# Patient Record
Sex: MALE | URBAN - METROPOLITAN AREA
[De-identification: names, ages, dates, MRNs, and addresses within clinical notes are randomized per-mention and may not be internally consistent; named-entity substitution may affect disease eponyms.]

---

## 2017-03-14 ENCOUNTER — OFFICE VISIT (OUTPATIENT)
Dept: FAMILY MEDICINE | Facility: CLINIC | Age: 22
End: 2017-03-14
Payer: COMMERCIAL

## 2017-03-14 VITALS
DIASTOLIC BLOOD PRESSURE: 80 MMHG | BODY MASS INDEX: 18.07 KG/M2 | HEIGHT: 69 IN | OXYGEN SATURATION: 99 % | HEART RATE: 104 BPM | WEIGHT: 122 LBS | SYSTOLIC BLOOD PRESSURE: 120 MMHG | TEMPERATURE: 97.5 F

## 2017-03-14 DIAGNOSIS — B35.4 TINEA CORPORIS: ICD-10-CM

## 2017-03-14 DIAGNOSIS — Z79.4 ENCOUNTER FOR LONG-TERM (CURRENT) USE OF INSULIN (H): ICD-10-CM

## 2017-03-14 DIAGNOSIS — Z23 NEED FOR PROPHYLACTIC VACCINATION AGAINST STREPTOCOCCUS PNEUMONIAE (PNEUMOCOCCUS): ICD-10-CM

## 2017-03-14 DIAGNOSIS — Z11.3 SCREENING EXAMINATION FOR VENEREAL DISEASE: ICD-10-CM

## 2017-03-14 DIAGNOSIS — Z23 NEED FOR HPV VACCINE: ICD-10-CM

## 2017-03-14 PROCEDURE — 90651 9VHPV VACCINE 2/3 DOSE IM: CPT | Performed by: PHYSICIAN ASSISTANT

## 2017-03-14 PROCEDURE — 90472 IMMUNIZATION ADMIN EACH ADD: CPT | Performed by: PHYSICIAN ASSISTANT

## 2017-03-14 PROCEDURE — 90732 PPSV23 VACC 2 YRS+ SUBQ/IM: CPT | Performed by: PHYSICIAN ASSISTANT

## 2017-03-14 PROCEDURE — 87591 N.GONORRHOEAE DNA AMP PROB: CPT | Performed by: PHYSICIAN ASSISTANT

## 2017-03-14 PROCEDURE — 87491 CHLMYD TRACH DNA AMP PROBE: CPT | Performed by: PHYSICIAN ASSISTANT

## 2017-03-14 PROCEDURE — 90471 IMMUNIZATION ADMIN: CPT | Performed by: PHYSICIAN ASSISTANT

## 2017-03-14 PROCEDURE — 99214 OFFICE O/P EST MOD 30 MIN: CPT | Mod: 25 | Performed by: PHYSICIAN ASSISTANT

## 2017-03-14 RX ORDER — PRENATAL VIT 91/IRON/FOLIC/DHA 28-975-200
COMBINATION PACKAGE (EA) ORAL 2 TIMES DAILY
Qty: 12 G | Refills: 1 | Status: SHIPPED | OUTPATIENT
Start: 2017-03-14 | End: 2018-06-08

## 2017-03-14 NOTE — MR AVS SNAPSHOT
After Visit Summary   3/14/2017    Leanne Landis    MRN: [id number]           Patient Information     Date Of Birth          1995        Visit Information        Provider Department      3/14/2017 1:20 PM Philly Venegas PA-C [facility]        Today's Diagnoses     Uncontrolled type 1 diabetes mellitus with complication (H)    -  1    Screening examination for venereal disease        Need for HPV vaccine        Need for prophylactic vaccination against Streptococcus pneumoniae (pneumococcus)        Encounter for long-term (current) use of insulin (H)        Tinea corporis          Care Instructions    Call to see endo and call to see the diabetic educator  Get eye exam  If you have not seen the endocrinologist in 3 weeks return to clinic   Check sugars 3 times a day -record them or bring meter        Follow-ups after your visit        Additional Services     DIABETES EDUCATOR REFERRAL       DIABETES SELF MANAGEMENT TRAINING (DSMT)      Your provider has referred you to Diabetes Education: [facility]: Diabetes Education - [facility] [phone]   [email or web address]    Type of training and number of hours: Previous Diagnosis: Follow-up DSMT - 2 hours.    Medicare covers: 10 hours of initial DSMT in 12 month period from the time of first visit, plus 2 hours of follow-up DSMT annually, and additional hours as requested for insulin training.    Diabetes Type: Type 1             Diabetes Co-Morbidities: none               A1C Goal:  <7.0       A1C is: Lab Results       Component                Value               Date                       A1C                      9.2                 11/05/2014              If an urgent visit is needed or A1C is above 12, Care Team to call the Diabetes Education Team at [phone] or send an In Basket message to the Diabetes Education Pool (P DIAB ED-PATIENT CARE).    Diabetes  Education Topics: Comprehensive Knowledge Assessment and Instruction    Special Educational Needs Requiring Individual DSMT: None       MEDICAL NUTRITION THERAPY (MNT) for Diabetes    Medical Nutrition Therapy with a Registered Dietitian can be provided in coordination with Diabetes Self-Management Training to assist in achieving optimal diabetes management.    MNT Type and Hours: Previous diagnosis: Annual follow-up MNT - 2 hours                       Medicare will cover: 3 hours initial MNT in 12 month period after first visit, plus 2 hours of follow-up MNT annually    Please be aware that coverage of these services is subject to the terms and limitations of your health insurance plan.  Call member services at your health plan to determine Diabetes Self-Management Training benefits and ask which blood glucose monitor brands are covered by your plan.      Please bring the following with you to your appointment:    (1)  List of current medications   (2)  List of Blood Glucose Monitor brands that are covered by your insurance plan  (3)  Blood Glucose Monitor and log book  (4)   Food records for the 3 days prior to your visit    The Certified Diabetes Educator may make diabetes medication adjustments per the CDE Protocol and Collaborative Practice Agreement.            ENDOCRINOLOGY ADULT REFERRAL       Your provider has referred you to: [facility]: Endocrinology and Diabetes Clinic [facility] [phone]   [email or web address]      Please be aware that coverage of these services is subject to the terms and limitations of your health insurance plan.  Call member services at your health plan with any benefit or coverage questions.      Please bring the following to your appointment:    >>   Any x-rays, CTs or MRIs which have been performed.  Contact the facility where they were done to arrange for  prior to your scheduled appointment.    >>   List of current  medications   >>   This referral request   >>   Any documents/labs given to you for this referral            OPTOMETRY REFERRAL       Your provider has referred you to:  [facility]: [facility] [place] [phone]   [email or web address]    Please be aware that coverage of these services is subject to the terms and limitations of your health insurance plan.  Call member services at your health plan with any benefit or coverage questions.      Please bring the following to your appointment:  >>   Any x-rays, CTs or MRIs which have been performed.  Contact the facility where they were done to arrange for  prior to your scheduled appointment.  Any new CT, MRI or other procedures ordered by your specialist must be performed at a [place] facility or coordinated by your clinic's referral office.    >>   List of current medications   >>   This referral request   >>   Any documents/labs given to you for this referral                  Who to contact     If you have questions or need follow up information about today's clinic visit or your schedule please contact [facility] directly at [phone].  Normal or non-critical lab and imaging results will be communicated to you by MyChart, letter or phone within 4 business days after the clinic has received the results. If you do not hear from us within 7 days, please contact the clinic through [employer] or phone. If you have a critical or abnormal lab result, we will notify you by phone as soon as possible.  Submit refill requests through [employer] or call your pharmacy and they will forward the refill request to us. Please allow 3 business days for your refill to be completed.          Additional Information About Your Visit        [employer] Information     [employer] lets you send messages to your doctor, view your test results, renew your prescriptions, schedule appointments and more. To sign up, go to  [email or web address] . Click on \"Log in\" on the left side of the screen, which will take you to the Welcome page. Then click on \"Sign up Now\" on the right side of the page.     You will be asked to enter the access code listed below, as well as some personal information. Please follow the directions to create your username and password.     Your access code is: [email or web address]  Expires: 2017  2:24 PM     Your access code will  in 90 days. If you need help or a new code, please call your [place] clinic or [phone].        Care EveryWhere ID     This is your Care EveryWhere ID. This could be used by other organizations to access your [place] medical records  [id number]        Your Vitals Were     Pulse Temperature Height Pulse Oximetry BMI (Body Mass Index)       104 97.5  F (36.4  C) (Oral) 5' 9\" (1.753 m) 99% 18.02 kg/m2        Blood Pressure from Last 3 Encounters:   17 120/80   16 128/82   16 (!) 135/94    Weight from Last 3 Encounters:   17 122 lb (55.3 kg)   16 145 lb (65.8 kg)   16 133 lb (60.3 kg)              We Performed the Following          ADMIN VACCINE, ADDL [43005]          ADMIN VACCINE, FIRST [68254]     C HUMAN PAPILLOMA VIRUS VACCINE (GARDASIL 9) 3 DOSE IM     CHLAMYDIA TRACHOMATIS PCR     DIABETES EDUCATOR REFERRAL     ENDOCRINOLOGY ADULT REFERRAL     NEISSERIA GONORRHOEA PCR     OPTOMETRY REFERRAL     PNEUMOVOCCAL VACCINE 23 VALENT (PNEUMOVAX 23)          Today's Medication Changes          These changes are accurate as of: 3/14/17  2:24 PM.  If you have any questions, ask your nurse or doctor.               Start taking these medicines.        Dose/Directions    terbinafine 1 % cream   Commonly known as:  lamISIL AT   Used for:  Tinea corporis   Started by:  Philly Venegas PA-C        Apply topically 2 times daily For fungal infection not resolved with other antifungals (e.g. Clotrimazole)   Quantity:  12 g   Refills:  1       "   These medicines have changed or have updated prescriptions.        Dose/Directions    insulin aspart 100 UNIT/ML injection   Commonly known as:  NovoLOG PENFILL   This may have changed:  additional instructions   Used for:  Encounter for long-term (current) use of insulin (H)   Changed by:  Philly Venegas PA-C        20 units before breakfast, 20 units before lunch, 25-30 units before dinner using sliding scale   Quantity:  30 mL   Refills:  0       insulin glargine 100 UNIT/ML injection   Commonly known as:  LANTUS SOLOSTAR   This may have changed:  additional instructions   Used for:  Encounter for long-term (current) use of insulin (H)   Changed by:  Philly Venegas PA-C        Dose:  25 Units   Inject 25 Units Subcutaneous At Bedtime Increase by 1 unit every 2 nights until blood sugar in am are less than 120   Quantity:  8 mL   Refills:  1            Where to get your medicines      These medications were sent to [facility]/pharmacy #[id number] - [place], [place] - [address], [place] [address]     Phone:  [phone]     blood glucose monitoring test strip    insulin aspart 100 UNIT/ML injection    insulin glargine 100 UNIT/ML injection    terbinafine 1 % cream                Primary Care Provider    Md Other Clinic                Thank you!     Thank you for choosing [facility]  for your care. Our goal is always to provide you with excellent care. Hearing back from our patients is one way we can continue to improve our services. Please take a few minutes to complete the written survey that you may receive in the mail after your visit with us. Thank you!             Your Updated Medication List - Protect others around you: Learn how to safely use, store and throw away your medicines at www.disposemymeds.org.          This list is accurate as of: 3/14/17  2:24 PM.  Always use your most recent med list.                   Brand Name Dispense  Instructions for use    blood glucose monitoring test strip    ACCU-CHEK SMARTVIEW    200 each    For 4-6 times per day testing       glucagon 1 MG kit    GLUCAGON EMERGENCY    1 mg    Inject 1 mg into the muscle once for 1 dose       insulin aspart 100 UNIT/ML injection    NovoLOG PENFILL    30 mL    20 units before breakfast, 20 units before lunch, 25-30 units before dinner using sliding scale       insulin glargine 100 UNIT/ML injection    LANTUS SOLOSTAR    8 mL    Inject 25 Units Subcutaneous At Bedtime Increase by 1 unit every 2 nights until blood sugar in am are less than 120       terbinafine 1 % cream    lamISIL AT    12 g    Apply topically 2 times daily For fungal infection not resolved with other antifungals (e.g. Clotrimazole)

## 2017-03-14 NOTE — PATIENT INSTRUCTIONS
Call to see endo and call to see the diabetic educator  Get eye exam  If you have not seen the endocrinologist in 3 weeks return to clinic   Check sugars 3 times a day -record them or bring meter

## 2017-03-14 NOTE — LETTER
[facility]  [address] [place]  [address]  [phone]        March 15, 2017    Leanne Landis  [address]  [place] [address]        Dear Leanne,    Your chlamydia and gonorrhea testing were negative.     Results for orders placed or performed in visit on 03/14/17   NEISSERIA GONORRHOEA PCR   Result Value Ref Range    Specimen Descrip Urine     N Gonorrhea PCR  NEG     Negative   Negative for N. gonorrhoeae rRNA by transcription mediated amplification.   A negative result by transcription mediated amplification does not preclude the   presence of N. gonorrhoeae infection because results are dependent on proper   and adequate collection, absence of inhibitors, and sufficient rRNA to be   detected.     CHLAMYDIA TRACHOMATIS PCR   Result Value Ref Range    Specimen Description Urine     Chlamydia Trachomatis PCR  NEG     Negative   Negative for C. trachomatis rRNA by transcription mediated amplification.   A negative result by transcription mediated amplification does not preclude the   presence of C. trachomatis infection because results are dependent on proper   and adequate collection, absence of inhibitors, and sufficient rRNA to be   detected.         If you have any questions please call the clinic at [phone].    Sincerely,    Philly ROSAS

## 2017-03-14 NOTE — PROGRESS NOTES
"  SUBJECTIVE:                                                    Leanne Landis is a 21 year old male who presents to clinic today for the following health issues:        Diabetes Follow-up      Patient is checking blood sugars: rarely.  Results are around 200+    Diabetic concerns: blood sugar frequently over 200     Symptoms of hypoglycemia (low blood sugar): none     Paresthesias (numbness or burning in feet) or sores: No     Date of last diabetic eye exam: Due now     Hyperlipidemia Follow-Up      Rate your low fat/cholesterol diet?: good    Taking statin?  No    Other lipid medications/supplements?:  none     Hypertension Follow-up      Outpatient blood pressures are not being checked.    Low Salt Diet: not monitoring salt       Hypothyroidism Follow-up      Since last visit, patient describes the following symptoms: weight loss of 20 lbs       Amount of exercise or physical activity: None    Problems taking medications regularly: Yes,  problems remembering to take    Not eating    Medication side effects: none  Diet: trying to stay low carb    Didn't want to see Endo due to how far out appointment was.  Using left over medications.  He had an appointment in Dec but no showed.  He was in the ER last year due to DKA.  When pressed on why he didn't go to Endo he said he was too busy.  He states he called for refill of medications multiple times.  No notes in the chart.      Taking \"about 20 units\" of lantus at night.  Can't give me an exact number.    novolog as directed-using sliding scale mostly for dinner.    Has been low on insulin so sometimes doesn't eat.      Has some bumps on mons pubis and started after shaving.  Still there.  No discharge.  They itch.          Problem list and histories reviewed & adjusted, as indicated.  Additional history: as documented    Patient Active Problem List   Diagnosis     Diabetes mellitus type 1 (H)     Encounter for long-term (current) use of insulin (H)     Type 1 diabetes " "mellitus, uncontrolled (H)     Type 1 diabetes, HbA1c goal < 7% (H)     History reviewed. No pertinent past surgical history.    Social History   Substance Use Topics     Smoking status: Current Every Day Smoker     Packs/day: 0.50     Years: 2.00     Types: Cigarettes     Smokeless tobacco: Never Used     Alcohol use Yes      Comment: a few glasses on the weekends      Family History   Problem Relation Age of Onset     Depression Mother      Hypertension Father      DIABETES Maternal Grandmother      Hypertension Maternal Grandmother      Thyroid Disease Maternal Grandmother      Hypertension Maternal Grandfather            Reviewed and updated as needed this visit by clinical staff       Reviewed and updated as needed this visit by Provider         ROS:  As above    OBJECTIVE:                                                    /80 (BP Location: Left arm, Patient Position: Chair, Cuff Size: Adult Regular)  Pulse 104  Temp 97.5  F (36.4  C) (Oral)  Ht 5' 9\" (1.753 m)  Wt 122 lb (55.3 kg)  SpO2 99%  BMI 18.02 kg/m2  Body mass index is 18.02 kg/(m^2).  GENERAL: healthy, alert and no distress  SKIN: firm nodules with some scaling on pubis     Diagnostic Test Results:  none      ASSESSMENT/PLAN:                                                    1. Uncontrolled type 1 diabetes mellitus with complication (H)  Spoke to pt about importance of him taking responsibility for his cristin and making it a priority.  Still needs to see endo.  Would like him to see DE first or as soon as able.  Follow up in clinic with me in 3 weeks to make sure we keep everything on tract.  Adjust lantus until sugars are below 120 in am.    - DIABETES EDUCATOR REFERRAL  - OPTOMETRY REFERRAL  - ENDOCRINOLOGY ADULT REFERRAL  - PNEUMOVOCCAL VACCINE 23 VALENT (PNEUMOVAX 23)  - insulin glargine (LANTUS SOLOSTAR) 100 UNIT/ML injection; Inject 25 Units Subcutaneous At Bedtime Increase by 1 unit every 2 nights until blood sugar in am are less than " 120  Dispense: 8 mL; Refill: 1  - insulin aspart (NOVOLOG PENFILL) 100 UNIT/ML injection; 20 units before breakfast, 20 units before lunch, 25-30 units before dinner using sliding scale  Dispense: 30 mL; Refill: 0  - blood glucose monitoring (ACCU-CHEK SMARTVIEW) test strip; For 4-6 times per day testing  Dispense: 200 each; Refill: 1    2. Encounter for long-term (current) use of insulin (H)      3. Screening examination for venereal disease  Follow up as needed  - NEISSERIA GONORRHOEA PCR  - CHLAMYDIA TRACHOMATIS PCR    4. Need for HPV vaccine    - C HUMAN PAPILLOMA VIRUS VACCINE (GARDASIL 9) 3 DOSE IM  -      ADMIN VACCINE, FIRST [56733]    5. Need for prophylactic vaccination against Streptococcus pneumoniae (pneumococcus)    -      ADMIN VACCINE, ADDL [61306]    6. Tinea corporis  If not improving follow up as needed    - terbinafine (LAMISIL AT) 1 % cream; Apply topically 2 times daily For fungal infection not resolved with other antifungals (e.g. Clotrimazole)  Dispense: 12 g; Refill: 1    FUTURE APPOINTMENTS:       - Follow-up visit in 3 weeks     Philly Venegas PA-C  [facility]

## 2017-03-14 NOTE — NURSING NOTE
"Chief Complaint   Patient presents with     Diabetes     Hypertension     Lipids     Thyroid Disease       Initial /80 (BP Location: Left arm, Patient Position: Chair, Cuff Size: Adult Regular)  Pulse 104  Temp 97.5  F (36.4  C) (Oral)  Ht 5' 9\" (1.753 m)  Wt 122 lb (55.3 kg)  SpO2 99%  BMI 18.02 kg/m2 Estimated body mass index is 18.02 kg/(m^2) as calculated from the following:    Height as of this encounter: 5' 9\" (1.753 m).    Weight as of this encounter: 122 lb (55.3 kg).  Medication Reconciliation: complete   Xuan Raymundo CMA       "

## 2017-03-15 LAB
C TRACH DNA SPEC QL NAA+PROBE: NORMAL
N GONORRHOEA DNA SPEC QL NAA+PROBE: NORMAL
SPECIMEN SOURCE: NORMAL
SPECIMEN SOURCE: NORMAL

## 2017-03-16 ENCOUNTER — TELEPHONE (OUTPATIENT)
Dept: PEDIATRICS | Facility: CLINIC | Age: 22
End: 2017-03-16

## 2017-03-16 ENCOUNTER — TELEPHONE (OUTPATIENT)
Dept: FAMILY MEDICINE | Facility: CLINIC | Age: 22
End: 2017-03-16

## 2017-03-16 NOTE — TELEPHONE ENCOUNTER
Patient needs insulin sent before he leaves to go out of town.  Pharmacy selected, he stated novolog not covered but humalog is.  I cued up similar as the novolog and routed to Dr. Schumacher to address.  Leticia Kohli RN  [facility]

## 2017-03-16 NOTE — TELEPHONE ENCOUNTER
Reason for Call:   prescription    Detailed comments: Sure script now has a online portal to start prior Auth. He states on the 2nd page of information they mailed to you it will have a TRX code and you will enter that to get in and complete this process.  ([employer]/paportal)    Phone Number Patient can be reached at:   [employer] [facility] [phone]       Best Time: anytime    Can we leave a detailed message on this number? NO    Call taken on 3/16/2017 at 2:35 PM by Gertrudis Roper

## 2017-03-16 NOTE — TELEPHONE ENCOUNTER
Another encounter in regards to this has already been sent. Patient just needs to change from Novolog to Humalog. Unsure as to why there is PA information as the other encounter states Humalog should be covered.  Will close out this encounter.    Nina Obregon RN  [facility]

## 2017-03-16 NOTE — TELEPHONE ENCOUNTER
Reason for Call:  Other prescription    Detailed comments: patient calling back, Insulin aspart (Novolog) is not covered under his insurance, needs to be switched to Hemalog. Please resend it to [facility]/pharmacy #[id number] - [place], [place] - [address]. Patient states this needs to be completed ASAP, he states he is boarding a flight at 2 PM.     Phone Number Patient can be reached at: Home number on file [phone] (home)    Best Time: any    Can we leave a detailed message on this number? YES    Call taken on 3/16/2017 at 12:46 PM by Emiliana Lehman

## 2017-03-16 NOTE — TELEPHONE ENCOUNTER
Uncontrolled type 1 diabetes mellitus with complication (H)  Spoke to pt about importance of him taking responsibility for his cristin and making it a priority. Still needs to see endo. Would like him to see DE first or as soon as able. Follow up in clinic with me in 3 weeks to make sure we keep everything on tract. Adjust lantus until sugars are below 120 in am.   - DIABETES EDUCATOR REFERRAL  - OPTOMETRY REFERRAL  - ENDOCRINOLOGY ADULT REFERRAL  - PNEUMOVOCCAL VACCINE 23 VALENT (PNEUMOVAX 23)  - insulin glargine (LANTUS SOLOSTAR) 100 UNIT/ML injection; Inject 25 Units Subcutaneous At Bedtime Increase by 1 unit every 2 nights until blood sugar in am are less than 120 Dispense: 8 mL; Refill: 1  - insulin aspart (NOVOLOG PENFILL) 100 UNIT/ML injection; 20 units before breakfast, 20 units before lunch, 25-30 units before dinner using sliding scale Dispense: 30 mL; Refill: 0  - blood glucose monitoring (ACCU-CHEK SMARTVIEW) test strip; For 4-6 times per day testing Dispense: 200 each; Refill: 1    Insulin aspart (Novolog)         Last Written Prescription Date: 3/14/17  Last Fill Quantity: 30 ml, # refills: 0  Last Office Visit with [facility] prescribing provider:  3/14/17   Next 5 appointments (look out 90 days)     Apr 04, 2017 10:00 AM CDT   Office Visit with Philly Venegas PA-C   [facility]    [address] [facility] [address] [phone]                   BP Readings from Last 3 Encounters:   03/14/17 120/80   11/07/16 128/82   06/24/16 (!) 135/94     Lab Results   Component Value Date    MICROL  01/21/2014     <5  Urine Microalbumin lowest reportable value has been changed from 2 mg/L to 5   mg/L due to a methodology change on April 16,2012.     No results found for: MICROALBUMIN  Creatinine   Date Value Ref Range Status   06/22/2016 0.73 mg/dL Final   ]  GFR Estimate   Date Value Ref Range Status    01/21/2014 >90 >60 mL/min/1.7m2 Final     GFR Estimate If Black   Date Value Ref Range Status   01/21/2014 >90 >60 mL/min/1.7m2 Final     Lab Results   Component Value Date    CHOL 150 01/21/2014     Lab Results   Component Value Date    HDL 59 01/21/2014     Lab Results   Component Value Date    LDL 65 01/21/2014     Lab Results   Component Value Date    TRIG 129 01/21/2014     Lab Results   Component Value Date    CHOLHDLRATIO 2.5 01/21/2014     Lab Results   Component Value Date    AST 24 01/21/2014     Lab Results   Component Value Date    ALT 24 01/21/2014     Lab Results   Component Value Date    A1C 9.2 11/05/2014    A1C 10.9 06/25/2014    A1C 9.3 04/07/2014    A1C 11.1 01/21/2014     Potassium   Date Value Ref Range Status   06/22/2016 3.0 (L) mmol/L Final   Routing refill request to provider for review/approval because:  No results for microalbumin, potassium low.  Nina Obregon RN  [facility]

## 2017-03-16 NOTE — TELEPHONE ENCOUNTER
Reason for Call:  Medication or medication refill:    Do you use a [facility]?  Name of the pharmacy and phone number for the current request:  [facility], pended    Name of the medication requested: insulin aspart (NOVOLOG PENFILL) 100 UNIT/ML injection    Other request: patient was seen on 3-14-17, but only a partial refill of this medication was sent.  Patient is needing it sent into pharmacy today please.    Can we leave a detailed message on this number? YES    Phone number patient can be reached at: Home number on file [phone] (home)    Best Time: any    Call taken on 3/16/2017 at 10:54 AM by Mei Velarde

## 2017-03-16 NOTE — TELEPHONE ENCOUNTER
See message below. Patient now saying insulin aspart is no longer covered by insurance and that it needs to be changed to Humalog.    Routed to provider to advise.  Nina Obregon RN  [facility]

## 2017-03-17 NOTE — TELEPHONE ENCOUNTER
Called patient at [phone] (home) to notify him that his insulin has been changed to Humalog. Unable to reach, left a VM to call back to RN triage line.    Nina Obregon RN  [facility]

## 2017-03-20 NOTE — TELEPHONE ENCOUNTER
Called patient at [phone] (home) to notify him of message below from provider. Unable to reach, left a VM to call back to RN triage line.    Nina Obregon RN  [facility]

## 2017-04-19 ENCOUNTER — TELEPHONE (OUTPATIENT)
Dept: FAMILY MEDICINE | Facility: CLINIC | Age: 22
End: 2017-04-19

## 2017-04-19 NOTE — LETTER
April 25, 2017    Leanne Landis  [address]  [facility] [address]    Dear Leanne    We care about your health and have reviewed your health plan. We have reviewed your medical conditions, medication list, and lab results and are making recommendations based on this review, to better manage your health.    You are in particular need of attention regarding:  - Your Diabetes      Here is a list of Health Maintenance topics that are due now or due soon:  Health Maintenance Due   Topic Date Due     FOOT EXAM Q1 YEAR( NO INBASKET)  12/29/1995     EYE EXAM Q1 YEAR( NO INBASKET)  12/29/1995     DIABETIC EDUCATION Q1 YEAR (NO INBASKET)  12/29/1995     TETANUS IMMUNIZATION (SYSTEM ASSIGNED)  12/29/2012     LIPID MONITORING Q1 YEAR( NO INBASKET)  01/21/2015     MICROALBUMIN Q1 YEAR( NO INBASKET)  01/21/2015     A1C Q6 MO( NO INBASKET)  05/05/2015     TSH W/ FREE T4 REFLEX Q2 YEAR (NO INBASKET)  01/21/2016     We will be calling you in the next couple of weeks to help you schedule any appointments that are needed.  Please call us at [phone] (or use [employer]) to address the above recommendations.     Thank you for trusting [facility] and we appreciate the opportunity to serve you.  We look forward to supporting your healthcare needs in the future.    Healthy Regards,    Your [facility] Care Team/nr

## 2017-04-19 NOTE — TELEPHONE ENCOUNTER
Please call pt.  He missed his last Diabetes follow up visit and needs to reschedule.  Needs to bring meter and come fasting.    Philly Venegas PA-C

## 2017-05-17 ENCOUNTER — TELEPHONE (OUTPATIENT)
Dept: FAMILY MEDICINE | Facility: CLINIC | Age: 22
End: 2017-05-17

## 2017-05-17 ENCOUNTER — OFFICE VISIT (OUTPATIENT)
Dept: FAMILY MEDICINE | Facility: CLINIC | Age: 22
End: 2017-05-17
Payer: COMMERCIAL

## 2017-05-17 VITALS
OXYGEN SATURATION: 100 % | TEMPERATURE: 97.7 F | BODY MASS INDEX: 18.46 KG/M2 | SYSTOLIC BLOOD PRESSURE: 120 MMHG | HEART RATE: 109 BPM | DIASTOLIC BLOOD PRESSURE: 79 MMHG | WEIGHT: 125 LBS

## 2017-05-17 DIAGNOSIS — Z72.0 TOBACCO ABUSE: ICD-10-CM

## 2017-05-17 DIAGNOSIS — R53.83 FATIGUE, UNSPECIFIED TYPE: ICD-10-CM

## 2017-05-17 DIAGNOSIS — E10.9 TYPE 1 DIABETES MELLITUS WITHOUT COMPLICATION (H): Primary | ICD-10-CM

## 2017-05-17 LAB
ALBUMIN SERPL-MCNC: 3.7 G/DL (ref 3.4–5)
ALP SERPL-CCNC: 79 U/L (ref 40–150)
ALT SERPL W P-5'-P-CCNC: 23 U/L (ref 0–70)
ANION GAP SERPL CALCULATED.3IONS-SCNC: 8 MMOL/L (ref 3–14)
AST SERPL W P-5'-P-CCNC: 20 U/L (ref 0–45)
BASOPHILS # BLD AUTO: 0 10E9/L (ref 0–0.2)
BASOPHILS NFR BLD AUTO: 0.6 %
BILIRUB SERPL-MCNC: 0.3 MG/DL (ref 0.2–1.3)
BUN SERPL-MCNC: 10 MG/DL (ref 7–30)
CALCIUM SERPL-MCNC: 9.5 MG/DL (ref 8.5–10.1)
CHLORIDE SERPL-SCNC: 108 MMOL/L (ref 94–109)
CHOLEST SERPL-MCNC: 193 MG/DL
CO2 SERPL-SCNC: 28 MMOL/L (ref 20–32)
CREAT SERPL-MCNC: 0.61 MG/DL (ref 0.66–1.25)
DIFFERENTIAL METHOD BLD: NORMAL
EOSINOPHIL # BLD AUTO: 0.2 10E9/L (ref 0–0.7)
EOSINOPHIL NFR BLD AUTO: 3.6 %
ERYTHROCYTE [DISTWIDTH] IN BLOOD BY AUTOMATED COUNT: 13.1 % (ref 10–15)
GFR SERPL CREATININE-BSD FRML MDRD: ABNORMAL ML/MIN/1.7M2
GLUCOSE SERPL-MCNC: 73 MG/DL (ref 70–99)
HCT VFR BLD AUTO: 44.5 % (ref 40–53)
HDLC SERPL-MCNC: 64 MG/DL
HGB BLD-MCNC: 15.4 G/DL (ref 13.3–17.7)
LDLC SERPL CALC-MCNC: 83 MG/DL
LYMPHOCYTES # BLD AUTO: 2.4 10E9/L (ref 0.8–5.3)
LYMPHOCYTES NFR BLD AUTO: 50.7 %
MCH RBC QN AUTO: 30.4 PG (ref 26.5–33)
MCHC RBC AUTO-ENTMCNC: 34.6 G/DL (ref 31.5–36.5)
MCV RBC AUTO: 88 FL (ref 78–100)
MONOCYTES # BLD AUTO: 0.4 10E9/L (ref 0–1.3)
MONOCYTES NFR BLD AUTO: 9.1 %
NEUTROPHILS # BLD AUTO: 1.7 10E9/L (ref 1.6–8.3)
NEUTROPHILS NFR BLD AUTO: 36 %
NONHDLC SERPL-MCNC: 129 MG/DL
PLATELET # BLD AUTO: 367 10E9/L (ref 150–450)
POTASSIUM SERPL-SCNC: 3.9 MMOL/L (ref 3.4–5.3)
PROT SERPL-MCNC: 7.7 G/DL (ref 6.8–8.8)
RBC # BLD AUTO: 5.07 10E12/L (ref 4.4–5.9)
SODIUM SERPL-SCNC: 144 MMOL/L (ref 133–144)
TRIGL SERPL-MCNC: 228 MG/DL
TSH SERPL DL<=0.005 MIU/L-ACNC: 1.13 MU/L (ref 0.4–4)
WBC # BLD AUTO: 4.7 10E9/L (ref 4–11)

## 2017-05-17 PROCEDURE — 36415 COLL VENOUS BLD VENIPUNCTURE: CPT | Performed by: FAMILY MEDICINE

## 2017-05-17 PROCEDURE — 83036 HEMOGLOBIN GLYCOSYLATED A1C: CPT | Performed by: FAMILY MEDICINE

## 2017-05-17 PROCEDURE — 99207 C FOOT EXAM  NO CHARGE: CPT | Mod: 25 | Performed by: FAMILY MEDICINE

## 2017-05-17 PROCEDURE — 80050 GENERAL HEALTH PANEL: CPT | Performed by: FAMILY MEDICINE

## 2017-05-17 PROCEDURE — 99213 OFFICE O/P EST LOW 20 MIN: CPT | Performed by: FAMILY MEDICINE

## 2017-05-17 PROCEDURE — 80061 LIPID PANEL: CPT | Performed by: FAMILY MEDICINE

## 2017-05-17 ASSESSMENT — PAIN SCALES - GENERAL: PAINLEVEL: NO PAIN (0)

## 2017-05-17 NOTE — PATIENT INSTRUCTIONS
Increase insulin doses needed as needed    Work on complete smoking cessation    Call to schedule endocrinology consult    We will send you lab results

## 2017-05-17 NOTE — MR AVS SNAPSHOT
After Visit Summary   5/17/2017    Leanne Landis    MRN: [id number]           Patient Information     Date Of Birth          1995        Visit Information        Provider Department      5/17/2017 11:00 AM Avery Goodman MD [facility]        Today's Diagnoses     Type 1 diabetes mellitus without complication (H)    -  1    Fatigue, unspecified type          Care Instructions    Increase insulin doses needed as needed    Work on complete smoking cessation    Call to schedule endocrinology consult    We will send you lab results          Follow-ups after your visit        Additional Services     DIABETES EDUCATOR REFERRAL       DIABETES SELF MANAGEMENT TRAINING (DSMT)      Your provider has referred you to Diabetes Education: [facility]: Diabetes Education - All [facility] [phone]   [email or web address]    Type of training and number of hours: Previous Diagnosis: Follow-up DSMT - 2 hours.    Medicare covers: 10 hours of initial DSMT in 12 month period from the time of first visit, plus 2 hours of follow-up DSMT annually, and additional hours as requested for insulin training.    Diabetes Type: Type 1             Diabetes Co-Morbidities: none               A1C Goal:  <8.0       A1C is: Lab Results       Component                Value               Date                       A1C                      9.2                 11/05/2014              If an urgent visit is needed or A1C is above 12, Care Team to call the Diabetes Education Team at [phone] or send an In Basket message to the Diabetes Education Pool (P DIAB ED-PATIENT CARE).    Diabetes Education Topics: Comprehensive Knowledge Assessment and Instruction    Special Educational Needs Requiring Individual DSMT: None       MEDICAL NUTRITION THERAPY (MNT) for Diabetes    Medical Nutrition Therapy with a Registered Dietitian can be provided in coordination with  Diabetes Self-Management Training to assist in achieving optimal diabetes management.    MNT Type and Hours: Previous diagnosis: Annual follow-up MNT - 2 hours                       Medicare will cover: 3 hours initial MNT in 12 month period after first visit, plus 2 hours of follow-up MNT annually    Please be aware that coverage of these services is subject to the terms and limitations of your health insurance plan.  Call member services at your health plan to determine Diabetes Self-Management Training benefits and ask which blood glucose monitor brands are covered by your plan.      Please bring the following with you to your appointment:    (1)  List of current medications   (2)  List of Blood Glucose Monitor brands that are covered by your insurance plan  (3)  Blood Glucose Monitor and log book  (4)   Food records for the 3 days prior to your visit    The Certified Diabetes Educator may make diabetes medication adjustments per the CDE Protocol and Collaborative Practice Agreement.            ENDOCRINOLOGY ADULT REFERRAL       Your provider has referred you to: [facility]: Endocrinology and Diabetes Clinic [facility] [phone]   [email or web address]  [facility]: [facility] [phone]   [email or web address]      Please be aware that coverage of these services is subject to the terms and limitations of your health insurance plan.  Call member services at your health plan with any benefit or coverage questions.      Please bring the following to your appointment:    >>   Any x-rays, CTs or MRIs which have been performed.  Contact the facility where they were done to arrange for  prior to your scheduled appointment.    >>   List of current medications   >>   This referral request   >>   Any documents/labs given to you for this referral            OPTOMETRY REFERRAL       Your  "provider has referred you to:  [facility]: [facility] [place] Becki George [phone]    [email or web address]      Please be aware that coverage of these services is subject to the terms and limitations of your health insurance plan.  Call member services at your health plan with any benefit or coverage questions.      Please bring the following to your appointment:  >>   Any x-rays, CTs or MRIs which have been performed.  Contact the facility where they were done to arrange for  prior to your scheduled appointment.  Any new CT, MRI or other procedures ordered by your specialist must be performed at a [facility] facility or coordinated by your clinic's referral office.    >>   List of current medications   >>   This referral request   >>   Any documents/labs given to you for this referral                  Who to contact     If you have questions or need follow up information about today's clinic visit or your schedule please contact [facility] directly at [phone].  Normal or non-critical lab and imaging results will be communicated to you by [employer], letter or phone within 4 business days after the clinic has received the results. If you do not hear from us within 7 days, please contact the clinic through [employer] or phone. If you have a critical or abnormal lab result, we will notify you by phone as soon as possible.  Submit refill requests through [employer] or call your pharmacy and they will forward the refill request to us. Please allow 3 business days for your refill to be completed.          Additional Information About Your Visit        [employer] Information     [employer] lets you send messages to your doctor, view your test results, renew your prescriptions, schedule appointments and more. To sign up, go to [email or web address] . Click on \"Log in\" on the left side of the screen, which will take you to the Welcome page. Then click on \"Sign up Now\" on the right " side of the page.     You will be asked to enter the access code listed below, as well as some personal information. Please follow the directions to create your username and password.     Your access code is: [email or web address]  Expires: 2017  2:24 PM     Your access code will  in 90 days. If you need help or a new code, please call your [place] clinic or [phone].        Care EveryWhere ID     This is your Care EveryWhere ID. This could be used by other organizations to access your [facility] medical records  [id number]        Your Vitals Were     Pulse Temperature Pulse Oximetry BMI (Body Mass Index)          109 97.7  F (36.5  C) (Oral) 100% 18.46 kg/m2         Blood Pressure from Last 3 Encounters:   17 120/79   17 120/80   16 128/82    Weight from Last 3 Encounters:   17 125 lb (56.7 kg)   17 122 lb (55.3 kg)   16 145 lb (65.8 kg)              We Performed the Following     CBC with platelets differential     Comprehensive metabolic panel     DIABETES EDUCATOR REFERRAL     ENDOCRINOLOGY ADULT REFERRAL     FOOT EXAM     Lipid panel reflex to direct LDL     OPTOMETRY REFERRAL     TSH with free T4 reflex          Today's Medication Changes          These changes are accurate as of: 17 12:06 PM.  If you have any questions, ask your nurse or doctor.               These medicines have changed or have updated prescriptions.        Dose/Directions    insulin glargine 100 UNIT/ML injection   Commonly known as:  LANTUS SOLOSTAR   This may have changed:    - how much to take  - how to take this  - when to take this  - additional instructions   Used for:  Type 1 diabetes mellitus without complication (H)   Changed by:  Avery Goodman MD        Increase by 1 unit every 2 nights until blood sugar in am are less than 120 Currently at 25 units but needs to increase   Quantity:  20 mL   Refills:  1       Insulin Lispro 200 UNIT/ML soln   Commonly known as:  HUMALOG KWIKPEN    This may have changed:  additional instructions   Used for:  Type 1 diabetes mellitus without complication (H)   Changed by:  Avery Goodman MD        20 units with meals 3 x daily but increase as needed based on sliding scale   Quantity:  15 mL   Refills:  1            Where to get your medicines      These medications were sent to [facility]/pharmacy #[id number] - [place], [place] - [address]  [address], [place] [address]     Phone:  [phone]     insulin glargine 100 UNIT/ML injection    Insulin Lispro 200 UNIT/ML soln                Primary Care Provider    [facility] Other Clinic                Thank you!     Thank you for choosing [facility]  for your care. Our goal is always to provide you with excellent care. Hearing back from our patients is one way we can continue to improve our services. Please take a few minutes to complete the written survey that you may receive in the mail after your visit with us. Thank you!             Your Updated Medication List - Protect others around you: Learn how to safely use, store and throw away your medicines at www.disposemymeds.org.          This list is accurate as of: 5/17/17 12:06 PM.  Always use your most recent med list.                   Brand Name Dispense Instructions for use    blood glucose monitoring test strip    ACCU-CHEK SMARTVIEW    200 each    For 4-6 times per day testing       glucagon 1 MG kit    GLUCAGON EMERGENCY    1 mg    Inject 1 mg into the muscle once for 1 dose       insulin glargine 100 UNIT/ML injection    LANTUS SOLOSTAR    20 mL    Increase by 1 unit every 2 nights until blood sugar in am are less than 120 Currently at 25 units but needs to increase       Insulin Lispro 200 UNIT/ML soln    HUMALOG KWIKPEN    15 mL    20 units with meals 3 x daily but increase as needed based on sliding scale       terbinafine 1 % cream    lamISIL AT    12 g    Apply topically 2 times daily For fungal infection not resolved  with other antifungals (e.g. Clotrimazole)

## 2017-05-17 NOTE — LETTER
[facility]  [address] [place]  [address]  [phone]        May 19, 2017    Leanne Landis  [address]  [facility] [address]        Dear Leanne,    As expected, the overall diabetes test ( hemoglobin a1c ) is very high.       See endocrinology as we discussed.  Also see the diabetes educator.     The triglycerides are high.  Other labs are okay.     Results for orders placed or performed in visit on 05/17/17   Lipid panel reflex to direct LDL   Result Value Ref Range    Cholesterol 193 <200 mg/dL    Triglycerides 228 (H) <150 mg/dL    HDL Cholesterol 64 >39 mg/dL    LDL Cholesterol Calculated 83 <100 mg/dL    Non HDL Cholesterol 129 <130 mg/dL   TSH with free T4 reflex   Result Value Ref Range    TSH 1.13 0.40 - 4.00 mU/L   Comprehensive metabolic panel   Result Value Ref Range    Sodium 144 133 - 144 mmol/L    Potassium 3.9 3.4 - 5.3 mmol/L    Chloride 108 94 - 109 mmol/L    Carbon Dioxide 28 20 - 32 mmol/L    Anion Gap 8 3 - 14 mmol/L    Glucose 73 70 - 99 mg/dL    Urea Nitrogen 10 7 - 30 mg/dL    Creatinine 0.61 (L) 0.66 - 1.25 mg/dL    GFR Estimate >90  Non  GFR Calc   >60 mL/min/1.7m2    GFR Estimate If Black >90   GFR Calc   >60 mL/min/1.7m2    Calcium 9.5 8.5 - 10.1 mg/dL    Bilirubin Total 0.3 0.2 - 1.3 mg/dL    Albumin 3.7 3.4 - 5.0 g/dL    Protein Total 7.7 6.8 - 8.8 g/dL    Alkaline Phosphatase 79 40 - 150 U/L    ALT 23 0 - 70 U/L    AST 20 0 - 45 U/L   CBC with platelets differential   Result Value Ref Range    WBC 4.7 4.0 - 11.0 10e9/L    RBC Count 5.07 4.4 - 5.9 10e12/L    Hemoglobin 15.4 13.3 - 17.7 g/dL    Hematocrit 44.5 40.0 - 53.0 %    MCV 88 78 - 100 fl    MCH 30.4 26.5 - 33.0 pg    MCHC 34.6 31.5 - 36.5 g/dL    RDW 13.1 10.0 - 15.0 %    Platelet Count 367 150 - 450 10e9/L    Diff Method Automated Method     % Neutrophils 36.0 %    % Lymphocytes 50.7 %    % Monocytes 9.1 %    % Eosinophils 3.6 %    % Basophils  0.6 %    Absolute Neutrophil 1.7 1.6 - 8.3 10e9/L    Absolute Lymphocytes 2.4 0.8 - 5.3 10e9/L    Absolute Monocytes 0.4 0.0 - 1.3 10e9/L    Absolute Eosinophils 0.2 0.0 - 0.7 10e9/L    Absolute Basophils 0.0 0.0 - 0.2 10e9/L   Hemoglobin A1c   Result Value Ref Range    Hemoglobin A1C 12.0 (H) 4.3 - 6.0 %       If you have any questions please call the clinic at [phone].    Sincerely,    Avery Goodman MD  SK[facility]

## 2017-05-17 NOTE — NURSING NOTE
"Chief Complaint   Patient presents with     Diabetes     Health Maintenance       Initial /79 (BP Location: Left arm, Patient Position: Chair, Cuff Size: Adult Small)  Pulse 109  Temp 97.7  F (36.5  C) (Oral)  Wt 125 lb (56.7 kg)  SpO2 100%  BMI 18.46 kg/m2 Estimated body mass index is 18.46 kg/(m^2) as calculated from the following:    Height as of 3/14/17: 5' 9\" (1.753 m).    Weight as of this encounter: 125 lb (56.7 kg).  Medication Reconciliation: complete   Liane Bravo CMA      "

## 2017-05-17 NOTE — PROGRESS NOTES
SUBJECTIVE:                                                    Leanne Landis is a 21 year old male who presents to clinic today for the following health issues:       Diabetes Follow-up      Patient is checking blood sugars: 2-4 times daily averaging about low 200's    Diabetic concerns: blood sugar frequently over 200     Symptoms of hypoglycemia (low blood sugar): none     Paresthesias (numbness or burning in feet) or sores: No     Date of last diabetic eye exam: ?       Amount of exercise or physical activity: 6-7 days/week for an average of 15-30 minutes    Problems taking medications regularly: No    Medication side effects: none    Diet: diabetic      none    Problem list and histories reviewed & adjusted, as indicated.  Additional history: as documented         Reviewed and updated as needed this visit by clinical staff  Tobacco  Allergies  Meds  Problems  Med Hx  Surg Hx  Fam Hx  Soc Hx        Reviewed and updated as needed this visit by Provider           Since about age 11    Studying global studies in college  One more semester        Physical Exam   Constitutional: He is oriented to person, place, and time and well-developed, well-nourished, and in no distress. No distress.   HENT:   Head: Normocephalic and atraumatic.   Eyes: Conjunctivae are normal.   Neck: Carotid bruit is not present.   Cardiovascular: Normal rate, regular rhythm, normal heart sounds and intact distal pulses.  Exam reveals no gallop and no friction rub.    No murmur heard.  Pulmonary/Chest: Effort normal and breath sounds normal. No respiratory distress. He has no wheezes. He has no rales.   Musculoskeletal: He exhibits no edema.   Neurological: He is alert and oriented to person, place, and time.   Skin: He is not diaphoretic.   Psychiatric: Mood and affect normal.   foot exam normal bilat      Type 1       Has seen endocrinology in past but not recently    humalog 20 tid with meals   lantus is 25 daily    Some extra on  sliding scale    Total insulin in 24 hours about 100 units    Mid 100s to low 200s    From [facility]        Eating jose    Smokes about 5 cigarettes per day    Wants to quit on his own    Exercise   Jogging, gym daily     No  Eye problems from diabetes    ASSESSMENT / PLAN:  (E10.9) Type 1 diabetes mellitus without complication (H)  (primary encounter diagnosis)  Comment: check labs; hemoglobin a1c likely to be high  Plan: FOOT EXAM, OPTOMETRY REFERRAL, DIABETES         EDUCATOR REFERRAL, Lipid panel reflex to direct        LDL, ENDOCRINOLOGY ADULT REFERRAL, insulin         glargine (LANTUS SOLOSTAR) 100 UNIT/ML         injection, Insulin Lispro (HUMALOG KWIKPEN) 200        UNIT/ML soln, CANCELED: Albumin Random Urine         Quantitative        Prudent to see endocrinology; patient to schedule  Advise he see diab ed also     (R53.83) Fatigue, unspecified type  Comment: check   Plan: TSH with free T4 reflex, Comprehensive         metabolic panel, CBC with platelets         differential             (Z72.0) Tobacco abuse  Comment: discussed   Plan: advised complete smoking cessation      I reviewed the patient's medications, allergies, medical history, family history, and social history.    Avery Goodman MD

## 2017-05-18 LAB — HBA1C MFR BLD: 12 % (ref 4.3–6)

## 2017-05-18 NOTE — TELEPHONE ENCOUNTER
Please call lab and see if they can add an hemoglobin a1c to blood just drawn in clinic.  I put in future order.  Lab can release order.  Thanks    Avery Goodman MD

## 2017-05-19 NOTE — PROGRESS NOTES
As expected, the overall diabetes test ( hemoglobin a1c ) is very high.      See endocrinology as we discussed.  Also see the diabetes educator.    The triglycerides are high.  Other labs are okay.    Avery Goodman MD

## 2017-11-27 ENCOUNTER — TRANSFERRED RECORDS (OUTPATIENT)
Dept: HEALTH INFORMATION MANAGEMENT | Facility: CLINIC | Age: 22
End: 2017-11-27

## 2017-12-27 RX ORDER — INSULIN GLARGINE 100 [IU]/ML
INJECTION, SOLUTION SUBCUTANEOUS
Qty: 15 ML | Refills: 0 | Status: SHIPPED | OUTPATIENT
Start: 2017-12-27 | End: 2018-06-08

## 2017-12-27 NOTE — TELEPHONE ENCOUNTER
Routing refill request to provider for review/approval because:  Labs out of range:  Cr  Labs not current:  Microalbumin (2014)        Lesly Segovia RN  [facility]

## 2017-12-27 NOTE — TELEPHONE ENCOUNTER
Requested Prescriptions   Pending Prescriptions Disp Refills     BASAGLAR 100 UNIT/ML injection [Pharmacy Med Name: BASAGLAR 100 UNIT/ML KWIKPEN]  1     Sig: INJECT 25 UNITS SUBQ AT BEDTIME INCREASE BY 1 UNIT EVERY 2 NIGHTS UNTIL BLOOD SUGAR IN AM <120    Long Acting Insulin Protocol Failed    12/24/2017 12:01 PM       Failed - Blood pressure less than 140/90 in past 6 months    BP Readings from Last 3 Encounters:   05/17/17 120/79   03/14/17 120/80   11/07/16 128/82                Failed - Microalbumin on file in past 12 months    Recent Labs   Lab Test  01/21/14   1425   MICROL  <5 Urine Microalbumin lowest reportable value has been changed from 2 mg/L to 5  mg/L due to a methodology change on April 16,2012.   UMALCR  Unable to calculate            Failed - HgbA1C in past 3 or 6 months    Recent Labs   Lab Test  05/17/17   1210   A1C  12.0*            Failed - Recent visit with authorizing provider's specialty in past 6 months    Patient had office visit in the last 6 months or has a visit in the next 30 days with authorizing provider.  See chart review.            Passed - LDL on file in past 12 months    Recent Labs   Lab Test  05/17/17   1210   LDL  83            Passed - Serum creatinine on file in past 12 months    Recent Labs   Lab Test  05/17/17   1210   CR  0.61*            Passed - Patient is age 18 or older

## 2017-12-31 ENCOUNTER — NURSE TRIAGE (OUTPATIENT)
Dept: NURSING | Facility: CLINIC | Age: 22
End: 2017-12-31

## 2017-12-31 NOTE — TELEPHONE ENCOUNTER
"Caller is out of his Prozac and wants and on call provider through [facility] to fill his prescription. Today is the first day without it. I explained there are no on call providers for [facility] and I can not see in his [facility] chart the order for Prozac, since it was ordered through [facility] provider. I gave him Urgent care options recommended by [facility], to be seen today if he would like for refill.     Katherine Hurd RN  [facility] RN  Lung Nodule and ED Lab Result F/u RN      Reason for Disposition    [1] Request for URGENT new prescription or refill of \"essential\" medication (i.e., likelihood of harm to patient if not taken) AND [2] triager unable to fill per unit policy    Additional Information    Negative: Drug overdose and nurse unable to answer question    Negative: Caller requesting information not related to medicine    Negative: Caller requesting a prescription for Strep throat and has a positive culture result    Negative: Rash while taking a medication or within 3 days of stopping it    Negative: Immunization reaction suspected    Negative: [1] Asthma and [2] having symptoms of asthma (cough, wheezing, etc)    Negative: MORE THAN A DOUBLE DOSE of a prescription or over-the-counter (OTC) drug    Negative: [1] DOUBLE DOSE (an extra dose or lesser amount) of over-the-counter (OTC) drug AND [2] any symptoms (e.g., dizziness, nausea, pain, sleepiness)    Negative: [1] DOUBLE DOSE (an extra dose or lesser amount) of prescription drug AND [2] any symptoms (e.g., dizziness, nausea, pain, sleepiness)    Negative: Took another person's prescription drug    Negative: [1] DOUBLE DOSE (an extra dose or lesser amount) of prescription drug AND [2] NO symptoms (Exception: a double dose of antibiotics)    Negative: Diabetes drug error or overdose (e.g., insulin or extra dose)    Protocols used: MEDICATION QUESTION CALL-ADULT-    "

## 2018-01-01 NOTE — TELEPHONE ENCOUNTER
Patient calling back again via [facility]'s call line to try to get a Prozac refill. Explained to patient what FNA explained the last time he called, that we are not able to get this medication filled for him tonight via on-call MD.  Kerry Adams RN  [facility]

## 2018-02-12 ENCOUNTER — TRANSFERRED RECORDS (OUTPATIENT)
Dept: HEALTH INFORMATION MANAGEMENT | Facility: CLINIC | Age: 23
End: 2018-02-12

## 2018-04-03 RX ORDER — INSULIN GLARGINE 100 [IU]/ML
INJECTION, SOLUTION SUBCUTANEOUS
Refills: 0 | OUTPATIENT
Start: 2018-04-03

## 2018-04-03 NOTE — TELEPHONE ENCOUNTER
"Requested Prescriptions   Pending Prescriptions Disp Refills     BASAGLAR 100 UNIT/ML injection [Pharmacy Med Name: BASAGLAR 100 UNIT/ML KWIKPEN]  0    Last Written Prescription Date:  12/27/17  Last Fill Quantity: 15,  # refills: 0   Last office visit: 5/17/2017 with prescribing provider:     Future Office Visit:     Sig: INJECT 25 UNITS SUBQ AT BEDTIME INCREASE BY 1 UNIT EVERY 2 NIGHTS UNTIL BLOOD SUGAR IN AM <120    Long Acting Insulin Protocol Failed    4/3/2018  1:43 AM       Failed - Blood pressure less than 140/90 in past 6 months    BP Readings from Last 3 Encounters:   05/17/17 120/79   03/14/17 120/80   11/07/16 128/82                Failed - Microalbumin on file in past 12 months    Recent Labs   Lab Test  01/21/14   1425   MICROL  <5 Urine Microalbumin lowest reportable value has been changed from 2 mg/L to 5  mg/L due to a methodology change on April 16,2012.   UMALCR  Unable to calculate            Failed - HgbA1C in past 3 or 6 months    Recent Labs   Lab Test  05/17/17   1210   A1C  12.0*            Failed - Recent (6 mo) or future (30 days) visit within the authorizing provider's specialty    Patient had office visit in the last 6 months or has a visit in the next 30 days with authorizing provider or within the authorizing provider's specialty.  See \"Patient Info\" tab in inbasket, or \"Choose Columns\" in Meds & Orders section of the refill encounter.           Passed - LDL on file in past 12 months    Recent Labs   Lab Test  05/17/17   1210   LDL  83            Passed - Serum creatinine on file in past 12 months    Recent Labs   Lab Test  05/17/17   1210   CR  0.61*            Passed - Patient is age 18 or older          "

## 2018-04-03 NOTE — TELEPHONE ENCOUNTER
Refill was denied.  Left detailed message, informing patient of provider note below.  Told to schedule an appointment with Endo at [facility] or [facility].  See referral.

## 2018-05-18 ENCOUNTER — TELEPHONE (OUTPATIENT)
Dept: FAMILY MEDICINE | Facility: CLINIC | Age: 23
End: 2018-05-18

## 2018-05-18 NOTE — TELEPHONE ENCOUNTER
Reason for Call:  Medication or medication refill:    Do you use a [facility]?  Name of the pharmacy and phone number for the current request:  [facility], pended    Name of the medication requested: blood glucose monitoring (ACCU-CHEK SMARTVIEW) test strip    Other request: patient is scheduled with provider on 5-21-18, but is needing a refill before then.  Please inform when done.    Can we leave a detailed message on this number? YES    Phone number patient can be reached at: Home number on file [phone] (home)    Best Time: any    Call taken on 5/18/2018 at 12:16 PM by Mei Velarde    Requested Prescriptions   Pending Prescriptions Disp Refills     blood glucose monitoring (ACCU-CHEK SMARTVIEW) test strip 200 each 1     Sig: For 4-6 times per day testing    There is no refill protocol information for this order

## 2018-06-08 ENCOUNTER — OFFICE VISIT (OUTPATIENT)
Dept: FAMILY MEDICINE | Facility: CLINIC | Age: 23
End: 2018-06-08
Payer: COMMERCIAL

## 2018-06-08 VITALS
SYSTOLIC BLOOD PRESSURE: 102 MMHG | DIASTOLIC BLOOD PRESSURE: 72 MMHG | BODY MASS INDEX: 17.92 KG/M2 | WEIGHT: 121 LBS | HEART RATE: 102 BPM | OXYGEN SATURATION: 96 % | TEMPERATURE: 98.6 F | HEIGHT: 69 IN

## 2018-06-08 DIAGNOSIS — E44.1 MILD PROTEIN-CALORIE MALNUTRITION (H): ICD-10-CM

## 2018-06-08 DIAGNOSIS — Z00.01 ENCOUNTER FOR PREVENTATIVE ADULT HEALTH CARE EXAM WITH ABNORMAL FINDINGS: Primary | ICD-10-CM

## 2018-06-08 DIAGNOSIS — F32.2 SEVERE MAJOR DEPRESSION (H): ICD-10-CM

## 2018-06-08 DIAGNOSIS — E10.65 TYPE 1 DIABETES MELLITUS WITH HYPERGLYCEMIA (H): ICD-10-CM

## 2018-06-08 LAB
ANION GAP SERPL CALCULATED.3IONS-SCNC: 10 MMOL/L (ref 3–14)
BUN SERPL-MCNC: 10 MG/DL (ref 7–30)
CALCIUM SERPL-MCNC: 9.1 MG/DL (ref 8.5–10.1)
CHLORIDE SERPL-SCNC: 102 MMOL/L (ref 94–109)
CHOLEST SERPL-MCNC: 145 MG/DL
CO2 SERPL-SCNC: 27 MMOL/L (ref 20–32)
CREAT SERPL-MCNC: 0.78 MG/DL (ref 0.66–1.25)
CREAT UR-MCNC: 200 MG/DL
GFR SERPL CREATININE-BSD FRML MDRD: >90 ML/MIN/1.7M2
GLUCOSE SERPL-MCNC: 178 MG/DL (ref 70–99)
HBA1C MFR BLD: 9.8 % (ref 0–5.6)
HDLC SERPL-MCNC: 53 MG/DL
LDLC SERPL CALC-MCNC: 76 MG/DL
MICROALBUMIN UR-MCNC: 41 MG/L
MICROALBUMIN/CREAT UR: 20.3 MG/G CR (ref 0–17)
NONHDLC SERPL-MCNC: 92 MG/DL
POTASSIUM SERPL-SCNC: 3.9 MMOL/L (ref 3.4–5.3)
SODIUM SERPL-SCNC: 139 MMOL/L (ref 133–144)
TRIGL SERPL-MCNC: 82 MG/DL

## 2018-06-08 PROCEDURE — 82043 UR ALBUMIN QUANTITATIVE: CPT | Performed by: NURSE PRACTITIONER

## 2018-06-08 PROCEDURE — 36415 COLL VENOUS BLD VENIPUNCTURE: CPT | Performed by: NURSE PRACTITIONER

## 2018-06-08 PROCEDURE — 83036 HEMOGLOBIN GLYCOSYLATED A1C: CPT | Performed by: NURSE PRACTITIONER

## 2018-06-08 PROCEDURE — 80048 BASIC METABOLIC PNL TOTAL CA: CPT | Performed by: NURSE PRACTITIONER

## 2018-06-08 PROCEDURE — 99207 C FOOT EXAM  NO CHARGE: CPT | Mod: 25 | Performed by: NURSE PRACTITIONER

## 2018-06-08 PROCEDURE — 99214 OFFICE O/P EST MOD 30 MIN: CPT | Mod: 25 | Performed by: NURSE PRACTITIONER

## 2018-06-08 PROCEDURE — 99395 PREV VISIT EST AGE 18-39: CPT | Performed by: NURSE PRACTITIONER

## 2018-06-08 PROCEDURE — 80061 LIPID PANEL: CPT | Performed by: NURSE PRACTITIONER

## 2018-06-08 RX ORDER — INSULIN GLARGINE 100 [IU]/ML
INJECTION, SOLUTION SUBCUTANEOUS
Qty: 15 ML | Refills: 0 | Status: SHIPPED | OUTPATIENT
Start: 2018-06-08 | End: 2018-07-12

## 2018-06-08 ASSESSMENT — PATIENT HEALTH QUESTIONNAIRE - PHQ9
5. POOR APPETITE OR OVEREATING: NEARLY EVERY DAY
SUM OF ALL RESPONSES TO PHQ QUESTIONS 1-9: 26
SUM OF ALL RESPONSES TO PHQ QUESTIONS 1-9: 26
10. IF YOU CHECKED OFF ANY PROBLEMS, HOW DIFFICULT HAVE THESE PROBLEMS MADE IT FOR YOU TO DO YOUR WORK, TAKE CARE OF THINGS AT HOME, OR GET ALONG WITH OTHER PEOPLE: EXTREMELY DIFFICULT

## 2018-06-08 ASSESSMENT — ANXIETY QUESTIONNAIRES
3. WORRYING TOO MUCH ABOUT DIFFERENT THINGS: NEARLY EVERY DAY
2. NOT BEING ABLE TO STOP OR CONTROL WORRYING: NEARLY EVERY DAY
GAD7 TOTAL SCORE: 21
5. BEING SO RESTLESS THAT IT IS HARD TO SIT STILL: NEARLY EVERY DAY
IF YOU CHECKED OFF ANY PROBLEMS ON THIS QUESTIONNAIRE, HOW DIFFICULT HAVE THESE PROBLEMS MADE IT FOR YOU TO DO YOUR WORK, TAKE CARE OF THINGS AT HOME, OR GET ALONG WITH OTHER PEOPLE: EXTREMELY DIFFICULT
1. FEELING NERVOUS, ANXIOUS, OR ON EDGE: NEARLY EVERY DAY
7. FEELING AFRAID AS IF SOMETHING AWFUL MIGHT HAPPEN: NEARLY EVERY DAY
6. BECOMING EASILY ANNOYED OR IRRITABLE: NEARLY EVERY DAY

## 2018-06-08 ASSESSMENT — PAIN SCALES - GENERAL: PAINLEVEL: NO PAIN (0)

## 2018-06-08 NOTE — LETTER
[facility]   [address] [place]  [address]  [phone]                                   June 12, 2018    Leanne Landis  [address] [facility] [address]        Dear Leanne,    The results of your recent labs are enclosed.  Your A1c is 9.8% which is improved from previously but still elevated above goal of less than 7%. I see that you have no yet scheduled with our diabetes educator or endocrinologist. Please remember to do this. I will not be refilling your insulin without appropriate follow up.     Please call the clinic if you have any concerns.    Results for orders placed or performed in visit on 06/08/18   HEMOGLOBIN A1C   Result Value Ref Range    Hemoglobin A1C 9.8 (H) 0 - 5.6 %   Lipid panel reflex to direct LDL Non-fasting   Result Value Ref Range    Cholesterol 145 <200 mg/dL    Triglycerides 82 <150 mg/dL    HDL Cholesterol 53 >39 mg/dL    LDL Cholesterol Calculated 76 <100 mg/dL    Non HDL Cholesterol 92 <130 mg/dL   Albumin Random Urine Quantitative with Creat Ratio   Result Value Ref Range    Creatinine Urine 200 mg/dL    Albumin Urine mg/L 41 mg/L    Albumin Urine mg/g Cr 20.30 (H) 0 - 17 mg/g Cr   Basic metabolic panel   Result Value Ref Range    Sodium 139 133 - 144 mmol/L    Potassium 3.9 3.4 - 5.3 mmol/L    Chloride 102 94 - 109 mmol/L    Carbon Dioxide 27 20 - 32 mmol/L    Anion Gap 10 3 - 14 mmol/L    Glucose 178 (H) 70 - 99 mg/dL    Urea Nitrogen 10 7 - 30 mg/dL    Creatinine 0.78 0.66 - 1.25 mg/dL    GFR Estimate >90 >60 mL/min/1.7m2    GFR Estimate If Black >90 >60 mL/min/1.7m2    Calcium 9.1 8.5 - 10.1 mg/dL       If you have any questions please call the clinic at APRN [phone]    Sincerely,    Camila Arias APRN CNP  bmd

## 2018-06-08 NOTE — MR AVS SNAPSHOT
After Visit Summary   6/8/2018    Leanne Landis    MRN: [id number]           Patient Information     Date Of Birth          1995        Visit Information        Provider Department      6/8/2018 1:20 PM Camila Arias APRN [facility]        Today's Diagnoses     Encounter for preventative adult health care exam with abnormal findings    -  1    Severe major depression (H)        Type 1 diabetes mellitus with hyperglycemia (H)        Screening examination for venereal disease        Uncontrolled type 1 diabetes mellitus with complication (H)        Type 1 diabetes mellitus without complication (H)          Care Instructions    Schedule an appointment with our diabetes educator  Schedule appointment with endocrine. It may be a few months until you can be seen but I will refill your medications as long you are seeing our diabetes educator    You can call our  to ask about enrolling in [employer] and to ask about therapists who offer sliding scale    I started you on sertraline (zoloft). Follow up in 1 month prior to refill being due      Preventive Health Recommendations  Male Ages 18 - 25     Yearly exam:             See your health care provider every year in order to  o   Review health changes.   o   Discuss preventive care.    o   Review your medicines if your doctor has prescribed any.    You should be tested each year for STDs (sexually transmitted diseases).     Talk to your provider about cholesterol testing.      If you are at risk for diabetes, you should have a diabetes test (fasting glucose).    Shots: Get a flu shot each year. Get a tetanus shot every 10 years.     Nutrition:    Eat at least 5 servings of fruits and vegetables daily.     Eat whole-grain bread, whole-wheat pasta and brown rice instead of white grains and rice.     Talk to your provider about calcium and Vitamin D.     Lifestyle    Exercise for at least 150 minutes a week (30  minutes a day, 5 days a week). This will help you control your weight and prevent disease.     Limit alcohol to one drink per day.     No smoking.     Wear sunscreen to prevent skin cancer.     See your dentist every six months for an exam and cleaning.             Follow-ups after your visit        Additional Services     DIABETES EDUCATOR REFERRAL       DIABETES SELF MANAGEMENT TRAINING (DSMT)      Your provider has referred you to Diabetes Education: [facility]: Diabetes Education - All [facility] [phone]   [email or web address]     If an urgent visit is needed or A1C is above 12, Care Team to call the Diabetes  Education Team at [phone] or send an In Basket message to the Diabetes Education Pool (P DIAB ED-PATIENT CARE).    A  will call you to make your appointment. If it has been more than 3 business days since your referral was placed, please call the above phone number to schedule.    Type of training and number of hours: Previous Diagnosis: Follow-up DSMT - 2 hours.    Diabetes Type: Type 1   Medicare covers: 10 hours of initial DSMT in 12 month period from the time of first visit, plus 2 hours of follow-up DSMT annually, and additional hours as requested for insulin training.         Diabetes Co-Morbidities: other: hyperglycemia, poor compliance in the past and multiple hospitalizations for DKA           A1C Goal:  <7.0       A1C is: Lab Results       Component                Value               Date                       A1C                      12.0                05/17/2017              MEDICAL NUTRITION THERAPY (MNT) for Diabetes    Medical Nutrition Therapy with a Registered Dietitian can be provided in coordination with Diabetes Self-Management Training to assist in achieving optimal diabetes management.    MNT Type and Hours: Do not initiate MNT at this time.                       Medicare will cover: 3 hours initial MNT in 12 month  period after first visit, plus 2 hours of follow-up MNT annually        Diabetes Education Topics: Comprehensive Knowledge Assessment and Instruction and Knowledge: Healthy Eating, Being Active, Monitoring Blood Sugar and Taking Medication    Special Educational Needs Requiring Individual DSMT: Additional Insulin Training      Please be aware that coverage of these services is subject to the terms and limitations of your health insurance plan.  Call member services at your health plan to determine Diabetes Self-Management Training (Codes  and ) and Medical Nutrition Therapy (Codes 57883 and 67301) benefits and ask which blood glucose monitor brands are covered by your plan.  Please bring the following with you to your appointment:    (1)  List of current medications   (2)  List of Blood Glucose Monitor brands that are covered by your insurance plan  (3)  Blood Glucose Monitor and log book  (4)   Food records for the 3 days prior to your visit    The Certified Diabetes Educator may make diabetes medication adjustments per the CDE Protocol and Collaborative Practice Agreement.            ENDOCRINOLOGY ADULT REFERRAL       Your provider has referred you to: [facility]: Endocrinology and Diabetes Clinic [facility] [phone]   [email or web address]      Please be aware that coverage of these services is subject to the terms and limitations of your health insurance plan.  Call member services at your health plan with any benefit or coverage questions.      Please bring the following to your appointment:    >>   Any x-rays, CTs or MRIs which have been performed.  Contact the facility where they were done to arrange for  prior to your scheduled appointment.    >>   List of current medications   >>   This referral request   >>   Any documents/labs given to you for this referral            MENTAL HEALTH REFERRAL  - Adult; Outpatient Treatment;  "Individual/Couples/Family/Group Therapy/Health Psychology; Other: [facility] Providers [phone]; We will contact you to schedule the appointment or please call with any questi...       He is looking for therapist who does sliding scale    All scheduling is subject to the client's specific insurance plan & benefits, provider/location availability, and provider clinical specialities.  Please arrive 15 minutes early for your first appointment and bring your completed paperwork.    Please be aware that coverage of these services is subject to the terms and limitations of your health insurance plan.  Call member services at your health plan with any benefit or coverage questions.                  Who to contact     If you have questions or need follow up information about today's clinic visit or your schedule please contact [facility] directly at [phone].  Normal or non-critical lab and imaging results will be communicated to you by MyChart, letter or phone within 4 business days after the clinic has received the results. If you do not hear from us within 7 days, please contact the clinic through [employer] or phone. If you have a critical or abnormal lab result, we will notify you by phone as soon as possible.  Submit refill requests through [employer] or call your pharmacy and they will forward the refill request to us. Please allow 3 business days for your refill to be completed.          Additional Information About Your Visit        [employer] Information     [employer] lets you send messages to your doctor, view your test results, renew your prescriptions, schedule appointments and more. To sign up, go to [email or web address] . Click on \"Log in\" on the left side of the screen, which will take you to the Welcome page. Then click on \"Sign up Now\" on the right side of the page.     You will be asked to enter the access code listed below, as well as some personal information. Please " "follow the directions to create your username and password.     Your access code is: [email or web address]  Expires: 2018 10:14 AM     Your access code will  in 90 days. If you need help or a new code, please call your [place] clinic or [phone].        Care EveryWhere ID     This is your Care EveryWhere ID. This could be used by other organizations to access your [place] medical records  [id number]        Your Vitals Were     Pulse Temperature Height Pulse Oximetry BMI (Body Mass Index)       102 98.6  F (37  C) (Oral) 5' 9\" (1.753 m) 96% 17.87 kg/m2        Blood Pressure from Last 3 Encounters:   18 102/72   17 120/79   17 120/80    Weight from Last 3 Encounters:   18 121 lb (54.9 kg)   17 125 lb (56.7 kg)   17 122 lb (55.3 kg)              We Performed the Following     Albumin Random Urine Quantitative with Creat Ratio     Basic metabolic panel     DIABETES EDUCATOR REFERRAL     ENDOCRINOLOGY ADULT REFERRAL     FOOT EXAM  NO CHARGE [29358.114]     HEMOGLOBIN A1C     Lipid panel reflex to direct LDL Non-fasting     MENTAL HEALTH REFERRAL  - Adult; Outpatient Treatment; Individual/Couples/Family/Group Therapy/Health Psychology; Other: [facility] Prov[facility] [phone]; We will contact you to schedule the appointment or please call with any questi...          Today's Medication Changes          These changes are accurate as of 18  2:08 PM.  If you have any questions, ask your nurse or doctor.               Start taking these medicines.        Dose/Directions    blood glucose lancets standard   Commonly known as:  no brand specified   Used for:  Type 1 diabetes mellitus with hyperglycemia (H)   Started by:  Camila Arias APRN CNP        Use to test blood sugar 4-6 times daily or as directed. One touch ultra   Quantity:  100 each   Refills:  11       insulin pen needle 31G X 6 MM   Used for:  Type 1 diabetes mellitus with hyperglycemia (H) "   Started by:  Camila Arias APRN CNP        Use 4 daily or as directed.   Quantity:  100 each   Refills:  11       sertraline 50 MG tablet   Commonly known as:  ZOLOFT   Used for:  Encounter for preventative adult health care exam with abnormal findings   Started by:  Camila Arias APRN CNP        Take 1/2 tablet (25 mg) for 1 week then increase to 1 tablet daily for 1 week then two tablets daily   Quantity:  40 tablet   Refills:  0         These medicines have changed or have updated prescriptions.        Dose/Directions    BASAGLAR 100 UNIT/ML injection   This may have changed:  Another medication with the same name was removed. Continue taking this medication, and follow the directions you see here.   Used for:  Uncontrolled type 1 diabetes mellitus with complication (H)   Changed by:  Camila Arias APRN CNP        INJECT 25 UNITS SUBQ AT BEDTIME INCREASE BY 1 UNIT EVERY 2 NIGHTS UNTIL BLOOD SUGAR IN AM <120   Quantity:  15 mL   Refills:  0       blood glucose monitoring test strip   Commonly known as:  no brand specified   This may have changed:  additional instructions   Used for:  Type 1 diabetes mellitus with hyperglycemia (H)   Changed by:  Camila Arias APRN CNP        Use to test blood sugars 4-6 times daily or as directed. Onetouch ultra   Quantity:  100 strip   Refills:  11            Where to get your medicines      These medications were sent to [facility]/pharmacy #[id number] - [place], [place] - [address], [place] [address]     Phone:  [phone]     BASAGLAR 100 UNIT/ML injection    blood glucose lancets standard    blood glucose monitoring test strip    Insulin Lispro 200 UNIT/ML soln    insulin pen needle 31G X 6 MM    sertraline 50 MG tablet                Primary Care Provider    None Specified       No primary provider on file.        Equal Access to Services     LIZETH ALFRED AH: dangelo Castelan,  daniel wilson maylinmeghana aragon. So [facility] [phone].    ATENCIÓN: Si augusto rain, tiene a brady disposición servicios gratuitos de asistencia lingüística. Rosa al [phone].    We comply with applicable federal civil rights laws and [place] laws. We do not discriminate on the basis of race, color, national origin, age, disability, sex, sexual orientation, or gender identity.            Thank you!     Thank you for choosing [facility]  for your care. Our goal is always to provide you with excellent care. Hearing back from our patients is one way we can continue to improve our services. Please take a few minutes to complete the written survey that you may receive in the mail after your visit with us. Thank you!             Your Updated Medication List - Protect others around you: Learn how to safely use, store and throw away your medicines at www.disposemymeds.org.          This list is accurate as of 6/8/18  2:08 PM.  Always use your most recent med list.                   Brand Name Dispense Instructions for use Diagnosis    BASAGLAR 100 UNIT/ML injection     15 mL    INJECT 25 UNITS SUBQ AT BEDTIME INCREASE BY 1 UNIT EVERY 2 NIGHTS UNTIL BLOOD SUGAR IN AM <120    Uncontrolled type 1 diabetes mellitus with complication (H)       blood glucose lancets standard    no brand specified    100 each    Use to test blood sugar 4-6 times daily or as directed. One touch ultra    Type 1 diabetes mellitus with hyperglycemia (H)       blood glucose monitoring test strip    no brand specified    100 strip    Use to test blood sugars 4-6 times daily or as directed. Onetouch ultra    Type 1 diabetes mellitus with hyperglycemia (H)       glucagon 1 MG kit    GLUCAGON EMERGENCY    1 mg    Inject 1 mg into the muscle once for 1 dose    Type 1 diabetes mellitus, uncontrolled (H), Encounter for long-term (current) use of insulin (H)       Insulin Lispro 200 UNIT/ML soln     HUMALOG KWIKPEN    15 mL    20 units with meals 3 x daily but increase as needed based on sliding scale    Type 1 diabetes mellitus without complication (H)       insulin pen needle 31G X 6 MM     100 each    Use 4 daily or as directed.    Type 1 diabetes mellitus with hyperglycemia (H)       sertraline 50 MG tablet    ZOLOFT    40 tablet    Take 1/2 tablet (25 mg) for 1 week then increase to 1 tablet daily for 1 week then two tablets daily    Encounter for preventative adult health care exam with abnormal findings

## 2018-06-08 NOTE — PROGRESS NOTES
SUBJECTIVE:   CC: Leanne Landis is an 22 year old male who presents for preventative health visit.     Physical   Annual:     Getting at least 3 servings of Calcium per day::  Yes    Bi-annual eye exam::  Yes    Dental care twice a year::  NO    Sleep apnea or symptoms of sleep apnea::  Daytime drowsiness    Diet::  Diabetic    Frequency of exercise::  4-5 days/week    Duration of exercise::  45-60 minutes    Taking medications regularly::  Yes    Medication side effects::  None    Additional concerns today::  YES          He has DM type 1  Does not follow with endocrine or primary care provider  He has had frequent hospital admissions for this, most recently Feb 2018 for DKA (outside records reviewed)  He has been referred to endocrine multiple times  Last A1c May 2017: 12.0  He in on Basaglar 25 every am  Approximately 20 units Humalog TID  Checking sugars TID  This morning was 130  Denies drug use.   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  In monogamous relationship  Declines STD testing      Today's PHQ-2 Score:   PHQ-2 ( 1999 [employer]) 6/8/2018   Q1: Little interest or pleasure in doing things 3   Q2: Feeling down, depressed or hopeless 3   PHQ-2 Score 6   Q1: Little interest or pleasure in doing things Nearly every day   Q2: Feeling down, depressed or hopeless Nearly every day   PHQ-2 Score 6       Abuse: Current or Past(Physical, Sexual or Emotional)- No  Do you feel safe in your environment - Yes    Social History   Substance Use Topics     Smoking status: Current Every Day Smoker     Packs/day: 0.50     Years:  2.00     Types: Cigarettes     Smokeless tobacco: Never Used     Alcohol use Yes      Comment: a few glasses on the weekends      Alcohol Use 6/8/2018   If you drink alcohol do you typically have greater than 3 drinks per day OR greater than 7 drinks per week? No       Last PSA: No results found for: PSA    Reviewed orders with patient. Reviewed health maintenance and updated orders accordingly - Yes  Labs reviewed in EPIC  BP Readings from Last 3 Encounters:   06/08/18 102/72   05/17/17 120/79   03/14/17 120/80    Wt Readings from Last 3 Encounters:   06/08/18 121 lb (54.9 kg)   05/17/17 125 lb (56.7 kg)   03/14/17 122 lb (55.3 kg)                  Patient Active Problem List   Diagnosis     Diabetes mellitus type 1 (H)     Encounter for long-term (current) use of insulin (H)     Type 1 diabetes mellitus, uncontrolled (H)     Type 1 diabetes, HbA1c goal < 7% (H)     Tobacco abuse     History reviewed. No pertinent surgical history.    Social History   Substance Use Topics     Smoking status: Current Every Day Smoker     Packs/day: 0.50     Years: 2.00     Types: Cigarettes     Smokeless tobacco: Never Used     Alcohol use Yes      Comment: a few glasses on the weekends      Family History   Problem Relation Age of Onset     Depression Mother      Hypertension Father      DIABETES Maternal Grandmother      Hypertension Maternal Grandmother      Thyroid Disease Maternal Grandmother      Hypertension Maternal Grandfather          Current Outpatient Prescriptions   Medication Sig Dispense Refill     BASAGLAR 100 UNIT/ML injection INJECT 25 UNITS SUBQ AT BEDTIME INCREASE BY 1 UNIT EVERY 2 NIGHTS UNTIL BLOOD SUGAR IN AM <120 15 mL 0     blood glucose (NO BRAND SPECIFIED) lancets standard Use to test blood sugar 4-6 times daily or as directed. One touch ultra 100 each 11     blood glucose monitoring (NO BRAND SPECIFIED) test strip Use to test blood sugars 4-6 times daily or as directed. Onetouch ultra 100 strip 11      glucagon (GLUCAGON EMERGENCY) 1 MG injection Inject 1 mg into the muscle once for 1 dose 1 mg 0     Insulin Lispro (HUMALOG KWIKPEN) 200 UNIT/ML soln 20 units with meals 3 x daily but increase as needed based on sliding scale 15 mL 0     insulin pen needle 31G X 6 MM Use 4 daily or as directed. 100 each 11     sertraline (ZOLOFT) 50 MG tablet Take 1/2 tablet (25 mg) for 1 week then increase to 1 tablet daily for 1 week then two tablets daily 40 tablet 0     [DISCONTINUED] BASAGLAR 100 UNIT/ML injection INJECT 25 UNITS SUBQ AT BEDTIME INCREASE BY 1 UNIT EVERY 2 NIGHTS UNTIL BLOOD SUGAR IN AM <120 15 mL 0     [DISCONTINUED] insulin glargine (LANTUS SOLOSTAR) 100 UNIT/ML injection Increase by 1 unit every 2 nights until blood sugar in am are less than 120 Currently at 25 units but needs to increase 20 mL 1     [DISCONTINUED] Insulin Lispro (HUMALOG KWIKPEN) 200 UNIT/ML soln 20 units with meals 3 x daily but increase as needed based on sliding scale 15 mL 1       Reviewed and updated as needed this visit by clinical staff  Tobacco  Allergies  Meds  Med Hx  Surg Hx  Fam Hx  Soc Hx        Reviewed and updated as needed this visit by Provider        Past Medical History:   Diagnosis Date     Type 1 diabetes, HbA1c goal < 7% (H) 11/19/2014        Review of Systems  CONSTITUTIONAL: NEGATIVE for fever, chills, change in weight  INTEGUMENTARY/SKIN: NEGATIVE for worrisome rashes, moles or lesions  EYES: NEGATIVE for vision changes or irritation  ENT: NEGATIVE for ear, mouth and throat problems  RESP: NEGATIVE for significant cough or SOB  CV: NEGATIVE for chest pain, palpitations or peripheral edema  GI: NEGATIVE for nausea, abdominal pain, heartburn, or change in bowel habits   male: negative for dysuria, hematuria, decreased urinary stream, erectile dysfunction, urethral discharge  MUSCULOSKELETAL: NEGATIVE for significant arthralgias or myalgia  NEURO: NEGATIVE for weakness, dizziness or paresthesias  PSYCHIATRIC:  "see HPI    OBJECTIVE:   /72 (BP Location: Left arm, Patient Position: Chair, Cuff Size: Adult Regular)  Pulse 102  Temp 98.6  F (37  C) (Oral)  Ht 5' 9\" (1.753 m)  Wt 121 lb (54.9 kg)  SpO2 96%  BMI 17.87 kg/m2    Physical Exam  GENERAL: healthy, alert and no distress  EYES: Eyes grossly normal to inspection, PERRL and conjunctivae and sclerae normal  HENT: ear canals and TM's normal, nose and mouth without ulcers or lesions  NECK: no adenopathy, no asymmetry, masses, or scars and thyroid normal to palpation  RESP: lungs clear to auscultation - no rales, rhonchi or wheezes  CV: regular rate and rhythm, normal S1 S2, no S3 or S4, no murmur, click or rub, no peripheral edema and peripheral pulses strong  ABDOMEN: soft, nontender, no hepatosplenomegaly, no masses and bowel sounds normal  MS: no gross musculoskeletal defects noted, no edema  SKIN: no suspicious lesions or rashes  NEURO: Normal strength and tone, mentation intact and speech normal  PSYCH: mentation appears normal, affect normal/bright, he is pleasant, conversation, bright affect, smiling    ASSESSMENT/PLAN:   1. Encounter for preventative adult health care exam with abnormal findings  - He is a new patient to me. We discussed the importance of regular follow up. He reports his last hospital admissions scared him and he wants to take better care of himself. I am happy to see him again and advised him to establish care with regular primary care provider. I explained that I will not be refilling medications without consistent follow up    2. Severe major depression (H)  - Severe depression, though his PHQ9 score does not match his bright, pleasant affect today. He is excited about starting a new job. He is smiling during out visit and speaks about how he is motivated to take care of himself.  Could not specify any specific life factors that are causing the severe depression. Will start on SSRI today and titrate to 100 mg sertraline over 4 weeks. " Reviewed risks and benefits. Schedule with CBT and follow up with 1 month.   - sertraline (ZOLOFT) 50 MG tablet; Take 1/2 tablet (25 mg) for 1 week then increase to 1 tablet daily for 1 week then two tablets daily  Dispense: 40 tablet; Refill: 0  - MENTAL HEALTH REFERRAL  - Adult; Outpatient Treatment; Individual/Couples/Family/Group Therapy/Health Psychology; Other: Behavioral Healthcare Providers [phone]; We will contact you to schedule the appointment or please call with any questi...    3. Type 1 diabetes mellitus with hyperglycemia (H)  - Poorly controlled in the past. He does not have his meter or readings with him today but reports that he is using medications consistently and blood sugars are much improved. He notes that previous to hospitalization he was missing doses of Basaglar and is no longer missing doses. He needs to follow with endocrine. (He has been referred multiple times most recently during hospital admission Feb 2018). Was recently advised he may need insulin pump. Unfortunately, it may be a few months until he can be seen. In the meantime he will schedule with our diabetes educator. I did tell him that I will not be refilling his insulin unless he schedules and goes to these appointments  - DIABETES EDUCATOR REFERRAL  - FOOT EXAM  NO CHARGE [[id number]]  - HEMOGLOBIN A1C  - Lipid panel reflex to direct LDL Non-fasting  - Albumin Random Urine Quantitative with Creat Ratio  - Basic metabolic panel  - ENDOCRINOLOGY ADULT REFERRAL  - BASAGLAR 100 UNIT/ML injection; INJECT 25 UNITS SUBQ AT BEDTIME INCREASE BY 1 UNIT EVERY 2 NIGHTS UNTIL BLOOD SUGAR IN AM <120  Dispense: 15 mL; Refill: 0  - Insulin Lispro (HUMALOG KWIKPEN) 200 UNIT/ML soln; 20 units with meals 3 x daily but increase as needed based on sliding scale  Dispense: 15 mL; Refill: 0  - blood glucose monitoring (NO BRAND SPECIFIED) test strip; Use to test blood sugars 4-6 times daily or as directed. Onetouch ultra  Dispense: 100  "strip; Refill: 11  - blood glucose (NO BRAND SPECIFIED) lancets standard; Use to test blood sugar 4-6 times daily or as directed. One touch ultra  Dispense: 100 each; Refill: 11  - insulin pen needle 31G X 6 MM; Use 4 daily or as directed.  Dispense: 100 each; Refill: 11    4. Mild protein-calorie malnutrition (H)  - Consider secondary to poorly controlled diabetes? Drug use (which he denies). Can discuss with our diabetes educator    COUNSELING:   Reviewed preventive health counseling, as reflected in patient instructions       Regular exercise       Healthy diet/nutrition       Vision screening       Safe sex practices/STD prevention         reports that he has been smoking Cigarettes.  He has a 1.00 pack-year smoking history. He has never used smokeless tobacco.  Tobacco Cessation Action Plan: Information offered: Patient not interested at this time  Estimated body mass index is 17.87 kg/(m^2) as calculated from the following:    Height as of this encounter: 5' 9\" (1.753 m).    Weight as of this encounter: 121 lb (54.9 kg).   Weight management plan he is a new patient to me, will monitor    Counseling Resources:  ATP IV Guidelines  Pooled Cohorts Equation Calculator  FRAX Risk Assessment  ICSI Preventive Guidelines  Dietary Guidelines for Americans, 2010  USDA's MyPlate  ASA Prophylaxis  Lung CA Screening    KARAN Fernandez [facility]  Answers for HPI/ROS submitted by the patient on 6/8/2018   PHQ-2 Score: 6  If you checked off any problems, how difficult have these problems made it for you to do your work, take care of things at home, or get along with other people?: Extremely difficult  PHQ9 TOTAL SCORE: 26    "

## 2018-06-08 NOTE — PATIENT INSTRUCTIONS
Schedule an appointment with our diabetes educator  Schedule appointment with endocrine. It may be a few months until you can be seen but I will refill your medications as long you are seeing our diabetes educator    You can call our  to ask about enrolling in [facility] and to ask about therapists who offer sliding scale    I started you on sertraline (zoloft). Follow up in 1 month prior to refill being due      Preventive Health Recommendations  Male Ages 18 - 25     Yearly exam:             See your health care provider every year in order to  o   Review health changes.   o   Discuss preventive care.    o   Review your medicines if your doctor has prescribed any.    You should be tested each year for STDs (sexually transmitted diseases).     Talk to your provider about cholesterol testing.      If you are at risk for diabetes, you should have a diabetes test (fasting glucose).    Shots: Get a flu shot each year. Get a tetanus shot every 10 years.     Nutrition:    Eat at least 5 servings of fruits and vegetables daily.     Eat whole-grain bread, whole-wheat pasta and brown rice instead of white grains and rice.     Talk to your provider about calcium and Vitamin D.     Lifestyle    Exercise for at least 150 minutes a week (30 minutes a day, 5 days a week). This will help you control your weight and prevent disease.     Limit alcohol to one drink per day.     No smoking.     Wear sunscreen to prevent skin cancer.     See your dentist every six months for an exam and cleaning.

## 2018-06-09 ASSESSMENT — PATIENT HEALTH QUESTIONNAIRE - PHQ9: SUM OF ALL RESPONSES TO PHQ QUESTIONS 1-9: 26

## 2018-06-09 ASSESSMENT — ANXIETY QUESTIONNAIRES: GAD7 TOTAL SCORE: 21

## 2018-06-12 ENCOUNTER — TELEPHONE (OUTPATIENT)
Dept: FAMILY MEDICINE | Facility: CLINIC | Age: 23
End: 2018-06-12

## 2018-06-12 DIAGNOSIS — Z91.199 PERSONAL HISTORY OF NONCOMPLIANCE WITH MEDICAL TREATMENT, PRESENTING HAZARDS TO HEALTH: ICD-10-CM

## 2018-06-12 NOTE — TELEPHONE ENCOUNTER
I saw patient for DM type 1, uncontrolled and severe MDD. I see he has not made appointment with endocrine and diabetes educator as advised. Placing referral to care coordinator.

## 2018-06-12 NOTE — PROGRESS NOTES
[address] [address]  Phone: [phone]  Fax: [phone]      06/12/18    Leanne Landis  [address]  [facility] [address]      Dear Leanne,    The results of your recent labs are enclosed.  Your A1c is 9.8% which is improved from previously but still elevated above goal of less than 7%. I see that you have no yet scheduled with our diabetes educator or endocrinologist. Please remember to do this. I will not be refilling your insulin without appropriate follow up.     Please call the clinic if you have any concerns.    Sincerely,    KARAN Fernandez CNP    Your [facility] Care Team

## 2018-06-13 ENCOUNTER — PATIENT OUTREACH (OUTPATIENT)
Dept: CARE COORDINATION | Facility: CLINIC | Age: 23
End: 2018-06-13

## 2018-06-13 NOTE — PROGRESS NOTES
Clinic Care Coordination Contact  [facility]/Voicemail    Referral Source: PCP  Clinical Data: Care Coordinator Outreach  Outreach attempted x 1.  Left message on voicemail with call back information and requested return call.  Plan: Care Coordinator will mail out care coordination introduction letter with care coordinator contact information and explanation of care coordination services. Care Coordinator will try to reach patient again in 1-2 business days.      Lyle Piper, MSN, RN, PHN I Clinic Care Coordinator  [facility]  Phone: [phone]  [email or web address]        Name: ADIEL CASTELLANO Date: 6/12/2018        Home: [phone]          [facility]     Plan:               PLAXD     Sponsor Code:       [id number]     Subscriber ID:      [id number]     Subscriber Name:    ITALO CASTELLANOEMEKA     Subscriber Address: [address] [place] [id number]          Effective From:     01/01/17     Effective To:            Group Number:       [id number]     Group Name  : Not Available               Date       Provider                   Department   Center            6/12/2018  [id number]-JBELSHAHEED ARIAS*CPFP         [facility]          Order Date:6/12/2018     Ordering User:SHAHEED ARIAS [[id number]]     Encounter Provider:Shaheed Arias APRN CNP [[id number]]     Authorizing Provider: Shaheed Arias APRN CNP [[id number]]     Department: FP/IM/PEDS[[id number]]          Ordering Provider NPI: [id number]  Shaheed Arias     [facility]~[address] [facility] [address]     Phone: [phone]                    Procedure Requested       9025.110 CARE COORDINATION REFERRAL            [#[id number]]         Priority: Routine  Class: Local Print         Comment:Services are provided by a Care Coordinator for people with complex                  needs such as:  medical, social, or financial troubles.  The Care                  Coordinator works with the patient and their Primary Care Provider                  to determine health goals, obtain resources, achieve outcomes, and                  develop care plans that help coordinate the patient's care.                                     Reason for Referral: Complex Medical Concerns/Education: Chronic                   diagnosis type 1 diabetic with history of noncompliance,                   inconsistent follow up                                    Additional pertinent details:  He is a type 1 diabetic. Does not                   have regular primary care provider and has inconsistent follow up.                   Multiple ED/hospital visits for DKA and referred to endocrine and                   then does not follow up. I first saw him last week. Very pleasant.                   Diagnosed with MDD and started on medication for depression. Also                   refilled insulin but advised he must schedule with endocrine and                   diabetes educator (A1c 9.8%) otherwise I will not be refilling                   medications. I see he has not scheduled these appointments yet.                   Can you please touch base with him and what help he needs with his                   diabetes and what barriers he has to making appointments?                                    Clinical Staff have discussed the Care Coordination Referral with                   the patient and/or caregiver: no       Associated Diagnoses         E10.8, E10.65 Uncontrolled type 1 diabetes mellitus with complication (H)         Z91.19 Personal history of noncompliance with medical treatment, presenting          hazards to health               ADIEL CASTELLANO              [id number]               : 1995  M      [address]                                 PCP: (None)                      [facility] [id number]                                CTR: [facility]

## 2018-06-13 NOTE — LETTER
[facility] CARE COORDINATION    June 13, 2018    Leanne Landis  [address]  [facility] [address]      Dear Leanne,    I am a clinic care coordinator who works with No primary care provider on file. at . I have been trying to reach you recently to introduce Clinic Care Coordination and to see if there was anything I could assist you with.  I wanted to introduce myself and provide you with my contact information so that you can call me with questions or concerns about your health care. Below is a description of clinic care coordination and how I can further assist you.     The clinic care coordinator is a registered nurse and/or  who understand the health care system. The goal of clinic care coordination is to help you manage your health and improve access to the [facility] system in the most efficient manner. The registered nurse can assist you in meeting your health care goals by providing education, coordinating services, and strengthening the communication among your providers. The  can assist you with financial, behavioral, psychosocial, chemical dependency, counseling, and/or psychiatric resources.    Please feel free to contact me at [phone], with any questions or concerns. We at [facility] are focused on providing you with the highest-quality healthcare experience possible and that all starts with you.     Sincerely,     Lyle Piper, MSN, RN, PHN I Clinic Care Coordinator  [facility]  Phone: [phone]  [email or web address]      Enclosed: I have enclosed a copy of a 24 Hour Access Plan. This has helpful phone numbers for you to call when needed. Please keep this in an easy to access place to use as needed.

## 2018-06-13 NOTE — LETTER
Health Care Home - Access Care Plan    About Me  Patient Name:  Leanne Landis    YOB: 1995  Age:                             22 year old   Aparna MRN:            [id number] Telephone Information:     Home Phone [phone]   Mobile [phone]       Address:    [address]  [facility] [address] Email address:  No e-mail address on record      Emergency Contact(s)  Name Relationship Lgl Grd Work Phone Home Phone Mobile Phone   1. YANNI KELLY Relative   [phone]    2. NO SECONDARY C*    [phone]              Health Maintenance:      My Access Plan  Medical Emergency 911   Questions or concerns during clinic hours Primary Clinic Line, I will call the clinic directly: [facility] [phone]   24 Hour Appointment Line [phone] or  [phone]) (toll free)   24 Hour Nurse Line [phone] (toll free)   Questions or concerns outside clinic hours 24 Hour Appointment Line, I will call the after-hours on-call line:   [facility] [phone] or [phone] ([phone]) (toll-free)      Preferred Urgent Care     Preferred Hospital     Preferred Pharmacy [id number] - [place], [place] - [address]     Behavioral Health Crisis Line The National Suicide Prevention Lifeline at 1-476.805.2636 or 911     My Care Team Members  Patient Care Team       Relationship Specialty Notifications Start End    Halina Schwartz MD MD INTERNAL MEDICINE - ENDOCRINOLOGY, DIABETES & METABOLISM  11/23/16     Phone: [phone] Fax: [phone]         [address] [facility] [address]    Lyle Pittman RN Clinic Care Coordinator Primary Care - CC Admissions 6/13/18     Phone: [phone] Fax: [phone]               My Medical and Care Information  Problem List   Patient Active Problem List   Diagnosis     Diabetes mellitus type 1 (H)     Encounter for long-term (current) use of insulin (H)     Type 1 diabetes mellitus,  uncontrolled (H)     Type 1 diabetes, HbA1c goal < 7% (H)     Tobacco abuse      Current Medications and Allergies:  See printed Medication Report

## 2018-06-15 NOTE — PROGRESS NOTES
Clinic Care Coordination Contact  [facility]/Voicemail    Referral Source: PCP  Clinical Data: Care Coordinator Outreach  Outreach attempted x 3.  Left message on voicemail with call back information and requested return call.  Plan: Care Coordinator mailed out care coordination introduction letter on 6-13-18. Care Coordinator will do no further outreaches at this time.      Lyle Piper, MSN, RN, PHN I Clinic Care Coordinator  [facility]  Phone: [phone]  [email or web address]

## 2018-06-20 ENCOUNTER — PATIENT OUTREACH (OUTPATIENT)
Dept: CARE COORDINATION | Facility: CLINIC | Age: 23
End: 2018-06-20

## 2018-06-20 NOTE — PROGRESS NOTES
Clinic Care Coordination Contact--Social Work Initial Call/Assessment   Care Team Conversations    Psychosocial: Patient left SW message on 6/18/2018 requesting return to discuss insurance.  Per his message, Patient is requesting assistance filing for health insurance as new employer does not provide insurance.  Patient left message at 3:42 pm on 6/20/2018 and sent e-mail this morning requesting SW return call to discuss above.  SW then received request from  to call Patient as he could not reach SW.      Call to Patient today to introduce self and apologize for not calling sooner.  Patient stated he did not initially realize that  was responsible for two clinics and understood the time delay.  Patient states need to apply for insurance as new employment does not have insurance resources.  Patient is concerned he obtain insurance as soon as able due to having DM.  Patient reports difficulty applying for [place].  SW suggested Patient complete application at [facility] who has assistance on site. Patient thanked SW and stated he would call if he has further needs.      Plan: 1) There will be no further SW intervention at this time    Kiah Hillman, SHANNEN, MSW   [facility]   [phone]  6/20/2018 11:57 AM

## 2018-06-21 NOTE — TELEPHONE ENCOUNTER
"Requested Prescriptions   Pending Prescriptions Disp Refills     ONETOUCH ULTRA test strip [Pharmacy Med Name: ONE TOUCH ULTRA BLUE TEST STRP] 200 strip 0    Last Written Prescription Date:  6-8-18  Last Fill Quantity: 100,  # refills: 11   Last office visit: 6/8/2018 with prescribing provider:     Future Office Visit:     Sig: FOR 4-6 TIMES PER DAY TESTING    Diabetic Supplies Protocol Passed    6/21/2018  1:44 AM       Passed - Patient is 18 years of age or older       Passed - Recent (6 mo) or future (30 days) visit within the authorizing provider's specialty    Patient had office visit in the last 6 months or has a visit in the next 30 days with authorizing provider.  See \"Patient Info\" tab in inbasket, or \"Choose Columns\" in Meds & Orders section of the refill encounter.              "

## 2018-06-22 NOTE — TELEPHONE ENCOUNTER
"I see test strips were just sent 6/8/18?   100 with 11 refills.    Spoke to pharmacy and verified patient has fills left; may disregard request.    \"Refusal\" routed back to pharmacy with note.    Leticia Kohli RN  [facility]      "

## 2018-07-12 DIAGNOSIS — E10.65 TYPE 1 DIABETES MELLITUS WITH HYPERGLYCEMIA (H): ICD-10-CM

## 2018-07-12 NOTE — LETTER
Dear Leanne,         Your Care Team has attempted to reach you multiple times regarding a recent refill request for Basaglar. One pen has been called to your pharmacy at this time. You have not followed up as instructed. No further refills will be provided without follow up. Please contact the [facility] to schedule a follow up visit and also a visit with the Diabetes Educator at [phone]. Please also contact the [facility] to schedule an appointment with Endocrinology at [phone]. This may take a few months to get in with endocrinology.      Sincerely,    Camila RUSSO CNP  LMD

## 2018-07-12 NOTE — TELEPHONE ENCOUNTER
"Requested Prescriptions   Pending Prescriptions Disp Refills     BASAGLAR 100 UNIT/ML injection [Pharmacy Med Name: BASAGLAR 100 UNIT/ML KWIKPEN]  0    Last Written Prescription Date:  6/8/18  Last Fill Quantity: 15,  # refills: 0   Last office visit: 6/8/2018 with prescribing provider:     Future Office Visit:     Sig: INJECT 25 UNITS SUBQ AT BEDTIME INCREASE BY 1 UNIT EVERY 2 NIGHTS UNTIL BLOOD SUGAR IN AM <120    Long Acting Insulin Protocol Passed    7/12/2018  1:50 AM       Passed - Blood pressure less than 140/90 in past 6 months    BP Readings from Last 3 Encounters:   06/08/18 102/72   05/17/17 120/79   03/14/17 120/80                Passed - LDL on file in past 12 months    Recent Labs   Lab Test  06/08/18   1421   LDL  76            Passed - Microalbumin on file in past 12 months    Recent Labs   Lab Test  06/08/18   1434   MICROL  41   UMALCR  20.30*            Passed - Serum creatinine on file in past 12 months    Recent Labs   Lab Test  06/08/18   1421   CR  0.78            Passed - HgbA1C in past 3 or 6 months    If HgbA1C is 8 or greater, it needs to be on file within the past 3 months.  If less than 8, must be on file within the past 6 months.     Recent Labs   Lab Test  06/08/18   1421   A1C  9.8*            Passed - Patient is age 18 or older       Passed - Recent (6 mo) or future (30 days) visit within the authorizing provider's specialty    Patient had office visit in the last 6 months or has a visit in the next 30 days with authorizing provider or within the authorizing provider's specialty.  See \"Patient Info\" tab in inbasket, or \"Choose Columns\" in Meds & Orders section of the refill encounter.              "

## 2018-07-15 NOTE — TELEPHONE ENCOUNTER
Last office visit 6/8 diabetes follow up discussed, patient was advised that endocrinology appointment and diabetic education appointment are needed and future refills wouldn't be approved without appropriate follow up. Routing to provider to address.    Kari Villagomez RN

## 2018-07-16 RX ORDER — INSULIN GLARGINE 100 [IU]/ML
INJECTION, SOLUTION SUBCUTANEOUS
Qty: 3 ML | Refills: 0 | Status: SHIPPED | OUTPATIENT
Start: 2018-07-16

## 2018-07-16 NOTE — TELEPHONE ENCOUNTER
I sent in refill for 1 pen. I advised patient that I would not be sending in refills without appropriate follow up which he acknowledged. No further refills will be given without follow up. Please help to schedule with a provider this week, and also schedule with diabetes educator and endocrinology. Ultimately, being a type 1 diabetic he should be managed by endocrine but it may be a few months until he can be seen

## 2018-07-31 DIAGNOSIS — F32.2 SEVERE MAJOR DEPRESSION (H): ICD-10-CM

## 2018-07-31 NOTE — TELEPHONE ENCOUNTER
Refilled x1 week  Overdue for follow up. I am also not sure what does he is taking as he should have run out weeks ago  Needs appointment for any refills

## 2018-07-31 NOTE — TELEPHONE ENCOUNTER
"Requested Prescriptions   Pending Prescriptions Disp Refills     sertraline (ZOLOFT) 50 MG tablet [Pharmacy Med Name: SERTRALINE HCL 50 MG TABLET] 40 tablet 0    Last Written Prescription Date:  6/8/18  Last Fill Quantity: 40,  # refills: 0   Last office visit: 6/8/2018 with prescribing provider:     Future Office Visit:     Sig: TAKE 1/2 TABLET (25 MG) FOR 1 WEEK THEN INCREASE TO 1 TABLET DAILY FOR 1 WEEK THEN TWO TABLETS DAILY    SSRIs Protocol Failed    7/31/2018  8:38 AM       Failed - PHQ-9 score less than 5 in past 6 months    Please review last PHQ-9 score.          Passed - Patient is age 18 or older       Passed - Recent (6 mo) or future (30 days) visit within the authorizing provider's specialty    Patient had office visit in the last 6 months or has a visit in the next 30 days with authorizing provider or within the authorizing provider's specialty.  See \"Patient Info\" tab in inbasket, or \"Choose Columns\" in Meds & Orders section of the refill encounter.              "

## 2018-07-31 NOTE — TELEPHONE ENCOUNTER
Routing refill request to provider for review/approval because:  RX Protocol Failed.  Please review refill.  Thank you.  Radha Driver RN

## 2018-08-14 DIAGNOSIS — F32.2 SEVERE MAJOR DEPRESSION (H): ICD-10-CM

## 2018-08-14 NOTE — TELEPHONE ENCOUNTER
TO PCP:  Please see below.  Patient rescheduled appt for 8/21/2018.  Please advise on refill.  Thank you.  Radha Driver RN

## 2018-08-14 NOTE — TELEPHONE ENCOUNTER
Reason for Call:  Medication or medication refill:    Do you use a [facility]?   No  Name of the pharmacy and phone number for the current request:  [id number] - [place], [place] - [address]      Name of the medication requested: sertraline 50 mg tab    Other request: Patient was scheduled to see Camila Arias today 8/14/18 but the appointment was needing to be rescheduled.  Patient rescheduled but will be out of his medication before the appointment and is requesting a refill to cover until his appointment.    Can we leave a detailed message on this number? YES    Phone number patient can be reached at: Home number on file [phone] (home)    Best Time: Any    Call taken on 8/14/2018 at 4:17 PM by Mini Pablo

## 2018-08-28 ENCOUNTER — TRANSFERRED RECORDS (OUTPATIENT)
Dept: HEALTH INFORMATION MANAGEMENT | Facility: CLINIC | Age: 23
End: 2018-08-28

## 2018-11-08 ENCOUNTER — TELEPHONE (OUTPATIENT)
Dept: FAMILY MEDICINE | Facility: CLINIC | Age: 23
End: 2018-11-08

## 2018-11-08 ASSESSMENT — PATIENT HEALTH QUESTIONNAIRE - PHQ9: SUM OF ALL RESPONSES TO PHQ QUESTIONS 1-9: 25

## 2018-11-08 NOTE — TELEPHONE ENCOUNTER
Panel Management Review      Patient has the following on his problem list:     Depression / Dysthymia review    Measure:  Needs PHQ-9 score of 4 or less during index window.  Administer PHQ-9 and if score is 5 or more, send encounter to provider for next steps.    5 - 7 month window range: 10/8/18-02/8/19    PHQ-9 SCORE 6/8/2018 6/8/2018   Total Score MyChart - 26 (Severe depression)   Total Score 26 26       If PHQ-9 recheck is 5 or more, route to provider for next steps.    Patient is due for:  PHQ9      Composite cancer screening  Chart review shows that this patient is due/due soon for the following None  Summary:    Patient is due/failing the following:   PHQ9    Action needed:   Patient needs to do PHQ9.    Type of outreach:    Called and patient score for the PHQ9 26, referred to team RN.    Questions for provider review:    None                                                                                                                                    ALIS Amador MA       Chart routed to closing chart .

## 2018-11-08 NOTE — TELEPHONE ENCOUNTER
MA alerted me that the patient score was elevated on phone     PHQ-9 score:    PHQ-9 SCORE 11/8/2018   Total Score MyChart -   Total Score 25       I called patient, he will be back in [place] next week, he says he has been off the zoloft for 2 months as he has no insurance, lost a job that had insurance and then had trouble with [employer] losing his early application.   He is doing the regular [employer] enrolling now during open enrollment and his dad is trying to put him back on his insurance.   He is a student at the [employer] so will call to get scheduled at [facility] and see if he can see someone about his depression.   His dad paid for his diabetes med out of pocket himself for him.    He says he took the zoloft for a month and did not think it helped (he started this after his 6/8 visit with Camila).   He says he is not any worse regarding depression now than he was then.   Denies any plans to harm himself and states he would not do that, is well-supported by family and has knowledge of resources such as suicide hotline.       Advised him to call if Alan cannot help him for the short term, we can see if any resources can be used to help him with med costs if needed (if he qualifies).  Call to schedule follow up when he has insurance either way.    Patient verbalized understanding of and agreement with plan.    Leticia Kohli RN  [facility]